# Patient Record
Sex: FEMALE | Race: WHITE | NOT HISPANIC OR LATINO | Employment: FULL TIME | URBAN - METROPOLITAN AREA
[De-identification: names, ages, dates, MRNs, and addresses within clinical notes are randomized per-mention and may not be internally consistent; named-entity substitution may affect disease eponyms.]

---

## 2020-06-28 PROBLEM — Z13.9 ENCOUNTER FOR MEDICAL SCREENING EXAMINATION: Status: ACTIVE | Noted: 2020-06-28

## 2020-07-09 ENCOUNTER — HOSPITAL ENCOUNTER (EMERGENCY)
Facility: HOSPITAL | Age: 30
Discharge: PSYCHIATRIC OR SUBSTANCE USE FACILITY | End: 2020-07-09
Attending: EMERGENCY MEDICINE
Payer: MEDICAID

## 2020-07-09 VITALS
HEIGHT: 61 IN | BODY MASS INDEX: 22.66 KG/M2 | SYSTOLIC BLOOD PRESSURE: 130 MMHG | TEMPERATURE: 98 F | HEART RATE: 91 BPM | DIASTOLIC BLOOD PRESSURE: 66 MMHG | WEIGHT: 120 LBS | OXYGEN SATURATION: 100 % | RESPIRATION RATE: 16 BRPM

## 2020-07-09 DIAGNOSIS — F31.9 BIPOLAR 1 DISORDER: ICD-10-CM

## 2020-07-09 DIAGNOSIS — F29 PSYCHOSIS, UNSPECIFIED PSYCHOSIS TYPE: Primary | ICD-10-CM

## 2020-07-09 DIAGNOSIS — F25.0 SCHIZOAFFECTIVE DISORDER, BIPOLAR TYPE: ICD-10-CM

## 2020-07-09 LAB
ALBUMIN SERPL BCP-MCNC: 4.5 G/DL (ref 3.5–5.2)
ALP SERPL-CCNC: 64 U/L (ref 55–135)
ALT SERPL W/O P-5'-P-CCNC: 9 U/L (ref 10–44)
AMPHET+METHAMPHET UR QL: NEGATIVE
ANION GAP SERPL CALC-SCNC: 8 MMOL/L (ref 8–16)
APAP SERPL-MCNC: <3 UG/ML (ref 10–20)
AST SERPL-CCNC: 20 U/L (ref 10–40)
B-HCG UR QL: NEGATIVE
BARBITURATES UR QL SCN>200 NG/ML: NEGATIVE
BASOPHILS # BLD AUTO: 0.04 K/UL (ref 0–0.2)
BASOPHILS NFR BLD: 0.6 % (ref 0–1.9)
BENZODIAZ UR QL SCN>200 NG/ML: NEGATIVE
BILIRUB SERPL-MCNC: 0.6 MG/DL (ref 0.1–1)
BILIRUB UR QL STRIP: NEGATIVE
BUN SERPL-MCNC: 17 MG/DL (ref 6–20)
BZE UR QL SCN: NEGATIVE
CALCIUM SERPL-MCNC: 9.3 MG/DL (ref 8.7–10.5)
CANNABINOIDS UR QL SCN: NEGATIVE
CHLORIDE SERPL-SCNC: 107 MMOL/L (ref 95–110)
CLARITY UR: CLEAR
CO2 SERPL-SCNC: 27 MMOL/L (ref 23–29)
COLOR UR: YELLOW
CREAT SERPL-MCNC: 0.9 MG/DL (ref 0.5–1.4)
CREAT UR-MCNC: 272.6 MG/DL (ref 15–325)
CTP QC/QA: YES
DIFFERENTIAL METHOD: ABNORMAL
EOSINOPHIL # BLD AUTO: 0 K/UL (ref 0–0.5)
EOSINOPHIL NFR BLD: 0.5 % (ref 0–8)
ERYTHROCYTE [DISTWIDTH] IN BLOOD BY AUTOMATED COUNT: 11.4 % (ref 11.5–14.5)
EST. GFR  (AFRICAN AMERICAN): >60 ML/MIN/1.73 M^2
EST. GFR  (NON AFRICAN AMERICAN): >60 ML/MIN/1.73 M^2
ETHANOL SERPL-MCNC: <10 MG/DL
GLUCOSE SERPL-MCNC: 96 MG/DL (ref 70–110)
GLUCOSE UR QL STRIP: NEGATIVE
HCT VFR BLD AUTO: 38.4 % (ref 37–48.5)
HGB BLD-MCNC: 12.8 G/DL (ref 12–16)
HGB UR QL STRIP: NEGATIVE
IMM GRANULOCYTES # BLD AUTO: 0.02 K/UL (ref 0–0.04)
IMM GRANULOCYTES NFR BLD AUTO: 0.3 % (ref 0–0.5)
KETONES UR QL STRIP: NEGATIVE
LEUKOCYTE ESTERASE UR QL STRIP: ABNORMAL
LYMPHOCYTES # BLD AUTO: 1.9 K/UL (ref 1–4.8)
LYMPHOCYTES NFR BLD: 29.4 % (ref 18–48)
MCH RBC QN AUTO: 32.2 PG (ref 27–31)
MCHC RBC AUTO-ENTMCNC: 33.3 G/DL (ref 32–36)
MCV RBC AUTO: 97 FL (ref 82–98)
METHADONE UR QL SCN>300 NG/ML: NEGATIVE
MICROSCOPIC COMMENT: NORMAL
MONOCYTES # BLD AUTO: 0.4 K/UL (ref 0.3–1)
MONOCYTES NFR BLD: 5.8 % (ref 4–15)
NEUTROPHILS # BLD AUTO: 4 K/UL (ref 1.8–7.7)
NEUTROPHILS NFR BLD: 63.4 % (ref 38–73)
NITRITE UR QL STRIP: NEGATIVE
NRBC BLD-RTO: 0 /100 WBC
OPIATES UR QL SCN: NEGATIVE
PCP UR QL SCN>25 NG/ML: NEGATIVE
PH UR STRIP: 7 [PH] (ref 5–8)
PLATELET # BLD AUTO: 206 K/UL (ref 150–350)
PMV BLD AUTO: 9.9 FL (ref 9.2–12.9)
POTASSIUM SERPL-SCNC: 3.9 MMOL/L (ref 3.5–5.1)
PROT SERPL-MCNC: 7.1 G/DL (ref 6–8.4)
PROT UR QL STRIP: NEGATIVE
RBC # BLD AUTO: 3.97 M/UL (ref 4–5.4)
RBC #/AREA URNS HPF: 2 /HPF (ref 0–4)
SARS-COV-2 RDRP RESP QL NAA+PROBE: NEGATIVE
SODIUM SERPL-SCNC: 142 MMOL/L (ref 136–145)
SP GR UR STRIP: 1.03 (ref 1–1.03)
TOXICOLOGY INFORMATION: NORMAL
TSH SERPL DL<=0.005 MIU/L-ACNC: 0.79 UIU/ML (ref 0.4–4)
URN SPEC COLLECT METH UR: ABNORMAL
UROBILINOGEN UR STRIP-ACNC: NEGATIVE EU/DL
WBC # BLD AUTO: 6.37 K/UL (ref 3.9–12.7)
WBC #/AREA URNS HPF: 3 /HPF (ref 0–5)

## 2020-07-09 PROCEDURE — 85025 COMPLETE CBC W/AUTO DIFF WBC: CPT

## 2020-07-09 PROCEDURE — 99285 EMERGENCY DEPT VISIT HI MDM: CPT | Mod: 25

## 2020-07-09 PROCEDURE — 81025 URINE PREGNANCY TEST: CPT | Performed by: EMERGENCY MEDICINE

## 2020-07-09 PROCEDURE — 80320 DRUG SCREEN QUANTALCOHOLS: CPT

## 2020-07-09 PROCEDURE — 80329 ANALGESICS NON-OPIOID 1 OR 2: CPT

## 2020-07-09 PROCEDURE — U0002 COVID-19 LAB TEST NON-CDC: HCPCS

## 2020-07-09 PROCEDURE — 25000003 PHARM REV CODE 250: Performed by: EMERGENCY MEDICINE

## 2020-07-09 PROCEDURE — 84443 ASSAY THYROID STIM HORMONE: CPT

## 2020-07-09 PROCEDURE — 80053 COMPREHEN METABOLIC PANEL: CPT

## 2020-07-09 PROCEDURE — 80307 DRUG TEST PRSMV CHEM ANLYZR: CPT

## 2020-07-09 PROCEDURE — 81000 URINALYSIS NONAUTO W/SCOPE: CPT | Mod: 59

## 2020-07-09 RX ORDER — BUSPIRONE HYDROCHLORIDE 10 MG/1
10 TABLET ORAL 3 TIMES DAILY
COMMUNITY
End: 2020-11-17

## 2020-07-09 RX ORDER — QUETIAPINE FUMARATE 100 MG/1
100 TABLET, FILM COATED ORAL NIGHTLY
Status: ON HOLD | COMMUNITY
End: 2020-07-17 | Stop reason: HOSPADM

## 2020-07-09 RX ORDER — CITALOPRAM 20 MG/1
20 TABLET, FILM COATED ORAL DAILY
COMMUNITY
End: 2020-11-17

## 2020-07-09 RX ORDER — BUSPIRONE HYDROCHLORIDE 10 MG/1
10 TABLET ORAL 2 TIMES DAILY
Status: DISCONTINUED | OUTPATIENT
Start: 2020-07-09 | End: 2020-07-09 | Stop reason: HOSPADM

## 2020-07-09 RX ORDER — LORAZEPAM 0.5 MG/1
1 TABLET ORAL
Status: DISCONTINUED | OUTPATIENT
Start: 2020-07-09 | End: 2020-07-09

## 2020-07-09 RX ORDER — TRAZODONE HYDROCHLORIDE 50 MG/1
50 TABLET ORAL NIGHTLY
Status: ON HOLD | COMMUNITY
End: 2020-07-17 | Stop reason: HOSPADM

## 2020-07-09 RX ADMIN — BUSPIRONE HYDROCHLORIDE 10 MG: 10 TABLET ORAL at 11:07

## 2020-07-09 NOTE — ED NOTES
Pt remains in paper scrubs. Sitter Tracy at bedside maintaining 1:1 direct visual contact and charting q15 minute patient check. Pt walking around room. Bed in low locked position. Will continue to monitor.

## 2020-07-09 NOTE — ED NOTES
Pt remains in paper scrubs. Bao Molina at bedside maintaining 1:1 direct visual contact and charting q15 minute patient check. Pt awake and alert. Respirations even and unlabored. Bed in low locked position. Side rails up x 2. Will continue to monitor.

## 2020-07-09 NOTE — ED NOTES
Pt remains in paper scrubs. Bao Molina at bedside maintaining 1:1 direct visual contact and charting q15 minute patient check. Pt resting quietly on bed. Respirations even and unlabored. Pt is alert. Bed in low locked position.Will continue to monitor.

## 2020-07-09 NOTE — ED NOTES
Patient has expressed that she does not want to return to the [facility] facility, Patient is aware that placement can not be changed

## 2020-07-09 NOTE — ED NOTES
Pt remains in paper scrubs. Sitter Tracy at bedside maintaining 1:1 direct visual contact and charting q15 minute patient check. Pt awake and alert. Respirations even and unlabored. Bed in low locked position. Will continue to monitor.

## 2020-07-09 NOTE — ED PROVIDER NOTES
Encounter Date: 2020    SCRIBE #1 NOTE: I, Aleyda Alvarenga, am scribing for, and in the presence of,  Kristian George MD. I have scribed the following portions of the note - Other sections scribed: HPI, ROS.       History     Chief Complaint   Patient presents with    Hallucinations     auditory hallucinations started x several days ago.   w/ suicidal thoughts.       This is a 30 y/o female who has Bipolar disorder, Marsha, Schizoaffective disorder, and Depression presents to the ED for an evaluation for auditory hallucinations for the past several days.  Patient reports she has been hearing the voices of her children despite them currently residing with their father.  She reports she is currently living with her mother and has been having some thoughts that her family is attempting to harm her, including placing something in her food.  She reports she was recently discharged from [facility] for a psychiatric evaluation 3-4 days ago.  She states she has been compliant with her medications, but is unsure of her next outpatient follow up appointment.  She denies fever, chills, nausea, emesis, diarrhea, abdominal pain, chest pain, shortness of breath, or any other associated symptoms.  No alleviating factors.    The history is provided by the patient.     Review of patient's allergies indicates:   Allergen Reactions    Hydroxyzine Swelling     Pt says her throat swells up     Past Medical History:   Diagnosis Date    Bipolar disorder     Depression     History of psychiatric hospitalization     Hx of psychiatric care     Marsha     Psychiatric exam requested by authority     Psychiatric problem     Schizoaffective disorder     Therapy      Past Surgical History:   Procedure Laterality Date     SECTION       Family History   Problem Relation Age of Onset    Bipolar disorder Mother     Schizophrenia Mother      Social History     Tobacco Use    Smoking status: Former Smoker     Packs/day: 1.00      Years: 18.00     Pack years: 18.00     Start date: 9/12/1998    Smokeless tobacco: Never Used    Tobacco comment: on the hazards of smoking   Substance Use Topics    Alcohol use: No     Comment: hx of drinking daquiris    Drug use: Not Currently     Types: Methamphetamines     Comment: Crystal Meth     Review of Systems   Constitutional: Negative for chills and fever.   HENT: Negative for congestion, ear pain, rhinorrhea and sore throat.    Eyes: Negative for pain and visual disturbance.   Respiratory: Negative for cough and shortness of breath.    Cardiovascular: Negative for chest pain.   Gastrointestinal: Negative for abdominal pain, diarrhea, nausea and vomiting.   Genitourinary: Negative for dysuria.   Musculoskeletal: Negative for back pain and neck pain.   Skin: Negative for rash.   Neurological: Negative for headaches.   Psychiatric/Behavioral: Positive for hallucinations.       Physical Exam     Initial Vitals [07/09/20 0814]   BP Pulse Resp Temp SpO2   121/85 110 20 98 °F (36.7 °C) 98 %      MAP       --         Physical Exam  The patient was examined specifically for the following:   General:No significant distress, Good color, Warm and dry. Head and neck:Scalp atraumatic, Neck supple. Neurological:Appropriate conversation, Gross motor deficits. Eyes:Conjugate gaze, Clear corneas. ENT: No epistaxis. Cardiac: Regular rate and rhythm, Grossly normal heart tones. Pulmonary: Wheezing, Rales. Gastrointestinal: Abdominal tenderness, Abdominal distention. Musculoskeletal: Extremity deformity, Apparent pain with range of motion of the joints. Skin: Rash.   The findings on examination were normal except for the following:  The patient is tearful.  She is paranoid that someone is poisoning her food.  She is hearing voices of her children.  She is crying during the physical exam.  The lungs are clear.  The heart tones are normal.  The abdomen is soft.  ED Course   Procedures  Labs Reviewed   CBC W/ AUTO  DIFFERENTIAL - Abnormal; Notable for the following components:       Result Value    RBC 3.97 (*)     Mean Corpuscular Hemoglobin 32.2 (*)     RDW 11.4 (*)     All other components within normal limits   COMPREHENSIVE METABOLIC PANEL - Abnormal; Notable for the following components:    ALT 9 (*)     All other components within normal limits   URINALYSIS, REFLEX TO URINE CULTURE - Abnormal; Notable for the following components:    Leukocytes, UA Trace (*)     All other components within normal limits    Narrative:     Specimen Source->Urine   ACETAMINOPHEN LEVEL - Abnormal; Notable for the following components:    Acetaminophen (Tylenol), Serum <3.0 (*)     All other components within normal limits   TSH   DRUG SCREEN PANEL, URINE EMERGENCY    Narrative:     Specimen Source->Urine   ALCOHOL,MEDICAL (ETHANOL)   SARS-COV-2 RNA AMPLIFICATION, QUAL   URINALYSIS MICROSCOPIC    Narrative:     Specimen Source->Urine   POCT URINE PREGNANCY          Imaging Results    None       Medical decision making:  Given the above this patient was just released from psychiatric facility.  She is hearing the voices of her children.  They are living with her father.  She is worried that people are poisoning her in her food.  She is crying and tearful and seems very depressed in unhappy.  She denies being suicidal or homicidal.  I believe the patient is gravely disabled.  I believe she has a mild psychosis.  She was just released from replace several days ago.  I will execute a pec in place her to Psychiatry.    This patient is medically clear for psychiatric admission.                Scribe Attestation:   Scribe #1: I performed the above scribed service and the documentation accurately describes the services I performed. I attest to the accuracy of the note.                          Clinical Impression:       ICD-10-CM ICD-9-CM   1. Psychosis, unspecified psychosis type  F29 298.9   2. Bipolar 1 disorder  F31.9 296.7   3. Schizoaffective  disorder, bipolar type  F25.0 295.70             ED Disposition Condition    Transfer to Psych Facility         ED Prescriptions     None        Follow-up Information    None                         I personally performed the services described in this documentation.  All medical record  entries made by the scribe are at my direction and in my presence.  Signed, Dr. Ariel George MD  07/09/20 1128

## 2020-07-09 NOTE — ED NOTES
Attempt made to call pt mother Maty, but no answer and unable to leave message. Pt does not want nurse to call mother in law Elise at this time.

## 2020-07-09 NOTE — ED NOTES
Katelynn Crabtree is in room # 12, was placed in paper scrubs and all cords and wires have been removed. The patient has signed their mental health rights and they have been placed in the chart. All the patient's belongings have been removed, labeled and locked in the belonging's closet. The PEC has been completed, signed, dated and placed in the patient's chart. There is a sitter Jamyra at the bedside with direct visual contact doing 15 minute observations and they have no belongings in the room. There are no family members present. The SADD tool was done on intake. The transfer sheet should be signed upon the actual transfer. The Psych hold orders have been signed, dated and placed in the chart. Vital signs are being done per orders.The psych resident will be notified. The next of kin to be notified at phone number (mother, Maty, [phone] or Mother in Law, Elise [phone]). The patient has not been medically cleared.

## 2020-07-09 NOTE — ED NOTES
"Pt reports that she was staying at the [facility] for 4 to 5 days and ever since leaving there "i'm just not myself." Was admitted to [facility] x 4 days for psychiatric tx and discharged. Went home to live with her mother Maty, but pt states her life felt like it was coming to an end, people don't want her here, and "felt like I  and came back to life." states the home has drug activity going on and she has been sober x 1 year from crystal meth, xanax, and marijuana. She does not have her own room at the home and sleeps on an air mattress. States she felt "disgusting" there and people were there with Hep C. Pt appears depressed and is crying. Has 3 children who live with their father. States she has not lost custody but left them with their father so she could get help and everyone is stabbing her in the back. Denies alcohol use and smoking. Reports compliance with her meds and "just want my life back." Pt states she hears voices of her children and family members.   "

## 2020-07-17 PROBLEM — Z13.9 ENCOUNTER FOR MEDICAL SCREENING EXAMINATION: Status: RESOLVED | Noted: 2020-06-28 | Resolved: 2020-07-17

## 2020-10-10 ENCOUNTER — HOSPITAL ENCOUNTER (EMERGENCY)
Facility: HOSPITAL | Age: 30
Discharge: PSYCHIATRIC OR SUBSTANCE USE FACILITY | End: 2020-10-11
Attending: EMERGENCY MEDICINE
Payer: MEDICAID

## 2020-10-10 DIAGNOSIS — F22 PARANOID: Primary | ICD-10-CM

## 2020-10-10 DIAGNOSIS — F23 ACUTE PSYCHOSIS: ICD-10-CM

## 2020-10-10 LAB
ALBUMIN SERPL BCP-MCNC: 4.4 G/DL (ref 3.5–5.2)
ALP SERPL-CCNC: 69 U/L (ref 55–135)
ALT SERPL W/O P-5'-P-CCNC: 15 U/L (ref 10–44)
AMPHET+METHAMPHET UR QL: NEGATIVE
ANION GAP SERPL CALC-SCNC: 14 MMOL/L (ref 8–16)
APAP SERPL-MCNC: <3 UG/ML (ref 10–20)
AST SERPL-CCNC: 20 U/L (ref 10–40)
B-HCG UR QL: NEGATIVE
BARBITURATES UR QL SCN>200 NG/ML: NEGATIVE
BASOPHILS # BLD AUTO: 0.04 K/UL (ref 0–0.2)
BASOPHILS NFR BLD: 0.5 % (ref 0–1.9)
BENZODIAZ UR QL SCN>200 NG/ML: NEGATIVE
BILIRUB SERPL-MCNC: 0.5 MG/DL (ref 0.1–1)
BILIRUB UR QL STRIP: NEGATIVE
BUN SERPL-MCNC: 9 MG/DL (ref 6–20)
BZE UR QL SCN: NEGATIVE
CALCIUM SERPL-MCNC: 9.4 MG/DL (ref 8.7–10.5)
CANNABINOIDS UR QL SCN: NEGATIVE
CHLORIDE SERPL-SCNC: 102 MMOL/L (ref 95–110)
CLARITY UR: CLEAR
CO2 SERPL-SCNC: 20 MMOL/L (ref 23–29)
COLOR UR: COLORLESS
CREAT SERPL-MCNC: 0.9 MG/DL (ref 0.5–1.4)
CREAT UR-MCNC: 24.7 MG/DL (ref 15–325)
CTP QC/QA: YES
CTP QC/QA: YES
DIFFERENTIAL METHOD: ABNORMAL
EOSINOPHIL # BLD AUTO: 0.1 K/UL (ref 0–0.5)
EOSINOPHIL NFR BLD: 0.6 % (ref 0–8)
ERYTHROCYTE [DISTWIDTH] IN BLOOD BY AUTOMATED COUNT: 11.2 % (ref 11.5–14.5)
EST. GFR  (AFRICAN AMERICAN): >60 ML/MIN/1.73 M^2
EST. GFR  (NON AFRICAN AMERICAN): >60 ML/MIN/1.73 M^2
ETHANOL SERPL-MCNC: <10 MG/DL
GLUCOSE SERPL-MCNC: 101 MG/DL (ref 70–110)
GLUCOSE UR QL STRIP: NEGATIVE
HCT VFR BLD AUTO: 36.1 % (ref 37–48.5)
HGB BLD-MCNC: 12.6 G/DL (ref 12–16)
HGB UR QL STRIP: NEGATIVE
IMM GRANULOCYTES # BLD AUTO: 0.02 K/UL (ref 0–0.04)
IMM GRANULOCYTES NFR BLD AUTO: 0.2 % (ref 0–0.5)
KETONES UR QL STRIP: NEGATIVE
LEUKOCYTE ESTERASE UR QL STRIP: NEGATIVE
LYMPHOCYTES # BLD AUTO: 3.1 K/UL (ref 1–4.8)
LYMPHOCYTES NFR BLD: 36.5 % (ref 18–48)
MCH RBC QN AUTO: 31.8 PG (ref 27–31)
MCHC RBC AUTO-ENTMCNC: 34.9 G/DL (ref 32–36)
MCV RBC AUTO: 91 FL (ref 82–98)
METHADONE UR QL SCN>300 NG/ML: NEGATIVE
MONOCYTES # BLD AUTO: 0.5 K/UL (ref 0.3–1)
MONOCYTES NFR BLD: 6 % (ref 4–15)
NEUTROPHILS # BLD AUTO: 4.8 K/UL (ref 1.8–7.7)
NEUTROPHILS NFR BLD: 56.2 % (ref 38–73)
NITRITE UR QL STRIP: NEGATIVE
NRBC BLD-RTO: 0 /100 WBC
OPIATES UR QL SCN: NEGATIVE
PCP UR QL SCN>25 NG/ML: NEGATIVE
PH UR STRIP: 6 [PH] (ref 5–8)
PLATELET # BLD AUTO: 279 K/UL (ref 150–350)
PMV BLD AUTO: 9.5 FL (ref 9.2–12.9)
POTASSIUM SERPL-SCNC: 3.6 MMOL/L (ref 3.5–5.1)
PROT SERPL-MCNC: 7.2 G/DL (ref 6–8.4)
PROT UR QL STRIP: NEGATIVE
RBC # BLD AUTO: 3.96 M/UL (ref 4–5.4)
SARS-COV-2 RDRP RESP QL NAA+PROBE: NEGATIVE
SODIUM SERPL-SCNC: 136 MMOL/L (ref 136–145)
SP GR UR STRIP: 1 (ref 1–1.03)
TOXICOLOGY INFORMATION: NORMAL
TSH SERPL DL<=0.005 MIU/L-ACNC: 1.19 UIU/ML (ref 0.4–4)
URN SPEC COLLECT METH UR: ABNORMAL
UROBILINOGEN UR STRIP-ACNC: NEGATIVE EU/DL
WBC # BLD AUTO: 8.52 K/UL (ref 3.9–12.7)

## 2020-10-10 PROCEDURE — 80307 DRUG TEST PRSMV CHEM ANLYZR: CPT

## 2020-10-10 PROCEDURE — 80329 ANALGESICS NON-OPIOID 1 OR 2: CPT

## 2020-10-10 PROCEDURE — 81003 URINALYSIS AUTO W/O SCOPE: CPT | Mod: 59

## 2020-10-10 PROCEDURE — 85025 COMPLETE CBC W/AUTO DIFF WBC: CPT

## 2020-10-10 PROCEDURE — 80320 DRUG SCREEN QUANTALCOHOLS: CPT

## 2020-10-10 PROCEDURE — 99213 PR OFFICE/OUTPT VISIT, EST, LEVL III, 20-29 MIN: ICD-10-PCS | Mod: 95,ICN,, | Performed by: PSYCHIATRY & NEUROLOGY

## 2020-10-10 PROCEDURE — 80053 COMPREHEN METABOLIC PANEL: CPT

## 2020-10-10 PROCEDURE — 84443 ASSAY THYROID STIM HORMONE: CPT

## 2020-10-10 PROCEDURE — 25000003 PHARM REV CODE 250: Performed by: EMERGENCY MEDICINE

## 2020-10-10 PROCEDURE — 99213 OFFICE O/P EST LOW 20 MIN: CPT | Mod: 95,ICN,, | Performed by: PSYCHIATRY & NEUROLOGY

## 2020-10-10 PROCEDURE — U0002 COVID-19 LAB TEST NON-CDC: HCPCS | Performed by: EMERGENCY MEDICINE

## 2020-10-10 PROCEDURE — 81025 URINE PREGNANCY TEST: CPT | Performed by: EMERGENCY MEDICINE

## 2020-10-10 PROCEDURE — 99285 EMERGENCY DEPT VISIT HI MDM: CPT

## 2020-10-10 RX ORDER — BUSPIRONE HYDROCHLORIDE 10 MG/1
10 TABLET ORAL 2 TIMES DAILY
Status: DISCONTINUED | OUTPATIENT
Start: 2020-10-10 | End: 2020-10-10

## 2020-10-10 RX ORDER — ACETAMINOPHEN 500 MG
1000 TABLET ORAL
Status: COMPLETED | OUTPATIENT
Start: 2020-10-10 | End: 2020-10-10

## 2020-10-10 RX ORDER — TRAZODONE HYDROCHLORIDE 100 MG/1
100 TABLET ORAL NIGHTLY
COMMUNITY
End: 2020-11-17

## 2020-10-10 RX ORDER — BUSPIRONE HYDROCHLORIDE 10 MG/1
10 TABLET ORAL ONCE
Status: COMPLETED | OUTPATIENT
Start: 2020-10-10 | End: 2020-10-10

## 2020-10-10 RX ORDER — QUETIAPINE FUMARATE 100 MG/1
100 TABLET, FILM COATED ORAL ONCE
Status: COMPLETED | OUTPATIENT
Start: 2020-10-10 | End: 2020-10-10

## 2020-10-10 RX ORDER — BUSPIRONE HYDROCHLORIDE 5 MG/1
5 TABLET ORAL ONCE
Status: DISCONTINUED | OUTPATIENT
Start: 2020-10-10 | End: 2020-10-10

## 2020-10-10 RX ADMIN — BUSPIRONE HYDROCHLORIDE 10 MG: 10 TABLET ORAL at 09:10

## 2020-10-10 RX ADMIN — ACETAMINOPHEN 1000 MG: 500 TABLET ORAL at 08:10

## 2020-10-10 RX ADMIN — QUETIAPINE FUMARATE 100 MG: 100 TABLET ORAL at 09:10

## 2020-10-11 VITALS
WEIGHT: 140 LBS | RESPIRATION RATE: 20 BRPM | BODY MASS INDEX: 26.43 KG/M2 | HEART RATE: 105 BPM | OXYGEN SATURATION: 98 % | HEIGHT: 61 IN | SYSTOLIC BLOOD PRESSURE: 117 MMHG | DIASTOLIC BLOOD PRESSURE: 72 MMHG | TEMPERATURE: 98 F

## 2020-10-11 NOTE — ED NOTES
PATIENT CURRENTLY PACING BACK AND FORTH, RUNNING FINGERS THROUGH HER HAIR AND TAKING SOCKS OFF AND ON.

## 2020-10-11 NOTE — ED TRIAGE NOTES
"Pt here stating "I fell like im not in my body. I feel like other people are putting drugs and alcohol in my body." Pt crying at this time. Pt reports hx of drug abuse, but has been clean for 14 months. Pt reports running out her medication about 5 days ago. Pt denies SI, reports she wants to hurt other people cause they are doing bad things to her.  "

## 2020-10-11 NOTE — ED PROVIDER NOTES
"Encounter Date: 10/10/2020    SCRIBE #1 NOTE: I, Pema Haroon, am scribing for, and in the presence of,  Jarod Menendez MD. I have scribed the following portions of the note - Other sections scribed: HPI, ROS.       History     Chief Complaint   Patient presents with    Psychiatric Evaluation     "Pt states she feels out of her body and feels like her family/mother is out to get her". Reports she feels like "other people are doing drugs in her body". Hx of substance abuse (crystal meth). Tearful during triage and scared people are out to get her. Pt reports she wants to hurt other people. Denies SI.    Paranoid     CC: Psychiatric Evaluation    HPI:  This is a 29 y.o. female who presents to the ED with a chief complaint of psychiatric evaluation. The patient states that she has not taken her medication in a few days. She feels that other people are out to get her and that "people are trying to do drugs in my body." The patient reports associated symptoms of paranoia, anxiety, and fretful sleep. No modifying factors were noted. She has a history of crystal meth use but has been sober for 14 months. The patient is currently staying with a friend of her mother.     The history is provided by the patient. The history is limited by the condition of the patient.     Review of patient's allergies indicates:   Allergen Reactions    Hydroxyzine Swelling     Pt says her throat swells up     Past Medical History:   Diagnosis Date    Bipolar disorder     Depression     History of psychiatric hospitalization     Hx of psychiatric care     Marsha     Psychiatric exam requested by authority     Psychiatric problem     Schizoaffective disorder     Therapy      Past Surgical History:   Procedure Laterality Date     SECTION       Family History   Problem Relation Age of Onset    Bipolar disorder Mother     Schizophrenia Mother      Social History     Tobacco Use    Smoking status: Former Smoker     " Packs/day: 1.00     Years: 18.00     Pack years: 18.00     Start date: 9/12/1998    Smokeless tobacco: Never Used    Tobacco comment: on the hazards of smoking   Substance Use Topics    Alcohol use: No     Comment: hx of drinking daquiris    Drug use: Not Currently     Types: Methamphetamines     Comment: Crystal Meth     Review of Systems   Constitutional: Negative.    HENT: Negative.    Eyes: Negative.    Respiratory: Negative.    Cardiovascular: Negative.    Gastrointestinal: Negative.    Genitourinary: Negative.    Musculoskeletal: Negative.    Skin: Negative.    Neurological: Negative.    Psychiatric/Behavioral: Positive for agitation, confusion, hallucinations and sleep disturbance. The patient is nervous/anxious.        Physical Exam     Initial Vitals [10/10/20 1948]   BP Pulse Resp Temp SpO2   128/84 92 18 99 °F (37.2 °C) 97 %      MAP       --         Physical Exam    Nursing note and vitals reviewed.  Constitutional: She appears well-developed and well-nourished. She is not diaphoretic. No distress.   HENT:   Head: Normocephalic and atraumatic.   Nose: Nose normal.   Eyes: EOM are normal. Pupils are equal, round, and reactive to light.   Neck: Normal range of motion. Neck supple. No JVD present.   Cardiovascular: Normal rate, regular rhythm and normal heart sounds.   No murmur heard.  Pulmonary/Chest: Breath sounds normal. No stridor. No respiratory distress. She has no wheezes. She has no rales.   Abdominal: Soft. Bowel sounds are normal. She exhibits no distension. There is no abdominal tenderness.   Musculoskeletal: Normal range of motion. No tenderness or edema.   Neurological: She is alert and oriented to person, place, and time. No cranial nerve deficit.   Skin: Skin is warm and dry. Capillary refill takes less than 2 seconds. No rash noted. No erythema.   Psychiatric: Her mood appears anxious. Her speech is rapid and/or pressured. Thought content is paranoid.         ED Course    Procedures  Labs Reviewed   URINALYSIS, REFLEX TO URINE CULTURE - Abnormal; Notable for the following components:       Result Value    Color, UA Colorless (*)     Specific [place], UA 1.000 (*)     All other components within normal limits    Narrative:     Specimen Source->Urine   ACETAMINOPHEN LEVEL - Abnormal; Notable for the following components:    Acetaminophen (Tylenol), Serum <3.0 (*)     All other components within normal limits   COMPREHENSIVE METABOLIC PANEL - Abnormal; Notable for the following components:    CO2 20 (*)     All other components within normal limits   CBC W/ AUTO DIFFERENTIAL - Abnormal; Notable for the following components:    RBC 3.96 (*)     Hematocrit 36.1 (*)     Mean Corpuscular Hemoglobin 31.8 (*)     RDW 11.2 (*)     All other components within normal limits   ALCOHOL,MEDICAL (ETHANOL)   TSH   DRUG SCREEN PANEL, URINE EMERGENCY    Narrative:     Specimen Source->Urine   POCT URINE PREGNANCY   SARS-COV-2 RDRP GENE          Imaging Results    None                     Scribe Attestation:   Scribe #1: I performed the above scribed service and the documentation accurately describes the services I performed. I attest to the accuracy of the note.      Patient underwent a work up in the emergency department including labs and physical exam, no acute organic cause of the patient's current psychiatric illness has been identified at this time. Patient medically cleared for psychiatric evaluation. Pt under PEC and with stable vitals pending psychiatric hospitalization acceptance.                 Medically cleared for psychiatry placement: 10/10/2020  9:32 PM                Clinical Impression:     ICD-10-CM ICD-9-CM   1. Paranoid  F22 297.8   2. Acute psychosis  F23 298.9                          ED Disposition Condition    Transfer to Psych Facility        I, Jarod Menendez M.D. , personally performed the services described in this documentation. All medical record entries made by  the scribe were at my direction and in my presence. I have reviewed the chart and agree that the record reflects my personal performance and is accurate and complete.  ED Prescriptions     None        Follow-up Information    None                                      Jarod Menendez MD  10/10/20 9027

## 2020-10-11 NOTE — ED NOTES
PATIENT WAS CALM UP ON LEAVING THE UNIT   Detail Level: Detailed Introduction Text (Please End With A Colon): The following procedure was deferred: Reason To Defer Override: referred to hand surgeon Dr. Troy for biopsy

## 2020-10-11 NOTE — CONSULTS
[facility]  Psychiatry  Telepsychiatry Consult Note    Please see previous notes:    Patient agreeable to consultation via telepsychiatry.    Tele-Consultation from Psychiatry started: 10/10/2020 at 830 pm   The chief complaint leading to psychiatric consultation is: agitation and paranoia  This consultation was requested by Dr. Menendez, the Emergency Department attending physician.  The location of the consulting psychiatrist is [facility].  The patient location is  [facility] EMERGENCY DEPARTMENT   The patient arrived at the ED at: this evening    Also present with the patient at the time of the consultation: tech    Patient Identification:   Katelynn Crabtree is a 29 y.o. female.    Patient information was obtained from patient and past medical records.    Consults  Subjective:     History of Present Illness:  This is a 29 year old woman with a history of schizoaffective disorder in the ED tonight with paranoia and intense affect.  She has been off her citalopram, trazodone and buspar for 5 days.  Per last d/c summary she should also have been taking depakote but states she stopped it earlier.  She left for [place] a couple of weeks ago to get away from family and 'all the people using crack'.  She states it didn't go right and people have been stealing her body and using drugs on her body and causing her to look like a drug addict and 'taking her face' away from her.  She doesn't feel real.  She feels she is being laughed and at and noone is helping her.  Denies SI.  States she homicidal against the people doing this to her and includes her mother in this.  'plus just all the people'.  Seems to feel I'm laughing at her too.  AH for 7 years she states.  Tearful but denies depressed mood.  Agreeable to hospitalization for stabalization.      Psychiatric History:   Previous Psychiatric Hospitalizations: Yes    Previous Medication Trials: Yes    Previous Suicide Attempts: no    History of Violence: no  History  "of Depression: yes  History of Marsha: yes  History of Auditory/Visual Hallucination yes  History of Delusions: yes  Outpatient psychiatrist (current & past): Yes    Substance Abuse History:  States that she has been clean for about 1 year but accused people of of causing her to have drug inside her in a psychotic manner    Legal History: Past charges/incarcerations: not asked     Family Psychiatric History: unk      Social History:  Lives with one of her mom's friend.  Her kids live in [place] with their dad.  She'd gone to [place] to get away from her mom and people smoking crack.    Psychiatric Mental Status Exam:  Arousal: alert  Sensorium/Orientation: oriented to grossly intact, person, place  Behavior/Cooperation: reluctant to participate, psychomotor agitation, restless and fidgety , eye contact normal   Speech: loud, pressured  Language: grossly intact  Mood: " im not depressed "   Affect: angry  Thought Process: racing, illogical  Thought Content:   Auditory hallucinations: YES:       Visual hallucinations: NO  Paranoia: YES:       Delusions:  YES:       Suicidal ideation: NO  Homicidal ideation: YES:       Attention/Concentration:  intact  Insight: poor awareness of illness  Judgment: limited      Past Medical History:   Past Medical History:   Diagnosis Date    Bipolar disorder     Depression     History of psychiatric hospitalization     Hx of psychiatric care     Marsha     Psychiatric exam requested by authority     Psychiatric problem     Schizoaffective disorder     Therapy       Laboratory Data:   Labs Reviewed   URINALYSIS, REFLEX TO URINE CULTURE - Abnormal; Notable for the following components:       Result Value    Color, UA Colorless (*)     Specific [place], UA 1.000 (*)     All other components within normal limits    Narrative:     Specimen Source->Urine   ACETAMINOPHEN LEVEL - Abnormal; Notable for the following components:    Acetaminophen (Tylenol), Serum <3.0 (*)     All other " components within normal limits   COMPREHENSIVE METABOLIC PANEL - Abnormal; Notable for the following components:    CO2 20 (*)     All other components within normal limits   CBC W/ AUTO DIFFERENTIAL - Abnormal; Notable for the following components:    RBC 3.96 (*)     Hematocrit 36.1 (*)     Mean Corpuscular Hemoglobin 31.8 (*)     RDW 11.2 (*)     All other components within normal limits   ALCOHOL,MEDICAL (ETHANOL)   DRUG SCREEN PANEL, URINE EMERGENCY    Narrative:     Specimen Source->Urine   TSH   POCT URINE PREGNANCY   SARS-COV-2 RDRP GENE     Allergies:    Review of patient's allergies indicates:   Allergen Reactions    Hydroxyzine Swelling     Pt says her throat swells up       Medications in ER:   Medications   QUEtiapine tablet 100 mg (has no administration in time range)   acetaminophen tablet 1,000 mg (1,000 mg Oral Given 10/10/20 2027)       Medications at home:   States she should have trazodone, citalopram and buspar.  Most recently d/c'd on:  busPIRone 10 MG tablet  Commonly known as: BUSPAR  Take 10 mg by mouth 3 (three) times daily.     citalopram 20 MG tablet  Commonly known as: CELEXA  Take 20 mg by mouth once daily.     * divalproex 500 MG Tbec  Commonly known as: DEPAKOTE  Take 1 tablet (500 mg total) by mouth every evening.     * divalproex 250 MG EC tablet  Commonly known as: DEPAKOTE  Take 1 tablet (250 mg total) by mouth once daily.    No new subjective & objective note has been filed under this hospital service since the last note was generated.      Assessment - Diagnosis - Goals:     Diagnosis/Impression: schizoaffective disorder, currently psychotic with mixed mood state and homicidal ideation.  Needs PEC    Rec:   PEC  Seroquel 100mg now     Time with patient: 15 min      More than 50% of the time was spent counseling/coordinating care    Consulting clinician was informed of the encounter and consult note.    Consultation ended: 10/10/2020 at 902 pm    Debra Deras MD    Psychiatry  [facility]

## 2020-10-11 NOTE — ED NOTES
PATIENT CURRENTLY RUNNING BACK AND FORTH TO RESTROOM X4. URINATED 2 OF THOSE TIMES A LITTLE BIT. SPENT MAJORITY OF TIME PULLING TOILET PAPER OFF ROLE AND WIPING PRIVATE AREA.

## 2020-11-17 ENCOUNTER — HOSPITAL ENCOUNTER (EMERGENCY)
Facility: HOSPITAL | Age: 30
Discharge: HOME | End: 2020-11-17
Attending: EMERGENCY MEDICINE
Payer: MEDICAID

## 2020-11-17 VITALS
RESPIRATION RATE: 18 BRPM | HEART RATE: 88 BPM | DIASTOLIC BLOOD PRESSURE: 77 MMHG | WEIGHT: 120 LBS | SYSTOLIC BLOOD PRESSURE: 132 MMHG | TEMPERATURE: 98 F | BODY MASS INDEX: 22.67 KG/M2 | OXYGEN SATURATION: 99 %

## 2020-11-17 DIAGNOSIS — F25.0 SCHIZOAFFECTIVE DISORDER, BIPOLAR TYPE: ICD-10-CM

## 2020-11-17 DIAGNOSIS — F41.9 ANXIETY: Primary | ICD-10-CM

## 2020-11-17 PROBLEM — F31.61 BIPOLAR 1 DISORDER, MIXED, MILD: Status: ACTIVE | Noted: 2020-11-17

## 2020-11-17 PROBLEM — F19.20 POLYSUBSTANCE DEPENDENCE: Status: ACTIVE | Noted: 2020-11-17

## 2020-11-17 PROCEDURE — 25000003 PHARM REV CODE 250: Performed by: EMERGENCY MEDICINE

## 2020-11-17 PROCEDURE — 99284 EMERGENCY DEPT VISIT MOD MDM: CPT

## 2020-11-17 PROCEDURE — 90792 PR PSYCHIATRIC DIAGNOSTIC EVALUATION W/MEDICAL SERVICES: ICD-10-PCS | Mod: ,,, | Performed by: PSYCHIATRY & NEUROLOGY

## 2020-11-17 PROCEDURE — 90792 PSYCH DIAG EVAL W/MED SRVCS: CPT | Mod: ,,, | Performed by: PSYCHIATRY & NEUROLOGY

## 2020-11-17 RX ORDER — LORAZEPAM 0.5 MG/1
1 TABLET ORAL
Status: COMPLETED | OUTPATIENT
Start: 2020-11-17 | End: 2020-11-17

## 2020-11-17 RX ORDER — QUETIAPINE FUMARATE 100 MG/1
100 TABLET, FILM COATED ORAL NIGHTLY
Qty: 30 TABLET | Refills: 0 | Status: ON HOLD | OUTPATIENT
Start: 2020-11-17 | End: 2023-03-13 | Stop reason: HOSPADM

## 2020-11-17 RX ORDER — BUSPIRONE HYDROCHLORIDE 10 MG/1
10 TABLET ORAL 3 TIMES DAILY
Qty: 90 TABLET | Refills: 11 | Status: ON HOLD | OUTPATIENT
Start: 2020-11-17 | End: 2023-03-13 | Stop reason: HOSPADM

## 2020-11-17 RX ORDER — BUSPIRONE HYDROCHLORIDE 10 MG/1
10 TABLET ORAL 2 TIMES DAILY
Status: DISCONTINUED | OUTPATIENT
Start: 2020-11-17 | End: 2020-11-17 | Stop reason: HOSPADM

## 2020-11-17 RX ORDER — BUTALBITAL, ACETAMINOPHEN AND CAFFEINE 50; 325; 40 MG/1; MG/1; MG/1
2 TABLET ORAL
Status: COMPLETED | OUTPATIENT
Start: 2020-11-17 | End: 2020-11-17

## 2020-11-17 RX ADMIN — LORAZEPAM 1 MG: 0.5 TABLET ORAL at 12:11

## 2020-11-17 RX ADMIN — BUTALBITAL, ACETAMINOPHEN AND CAFFEINE 2 TABLET: 50; 325; 40 TABLET ORAL at 12:11

## 2020-11-17 NOTE — ED PROVIDER NOTES
"Encounter Date: 2020    SCRIBE #1 NOTE: I, Connielynette Guerrero, am scribing for, and in the presence of,  Kristian George MD. I have scribed the following portions of the note - Other sections scribed: HPI, ROS.       History     Chief Complaint   Patient presents with    Panic Attack     arrived with [facility] she feels like " ppl are doing drugs in my body"No SI.     CC: Panic Attack    HPI: This 29 y.o female, with a medical history of bipolar disorder, depression, nicolás, and schizoaffective disorder, presents to the ED with [facility] c/o acute panic attacks accompanied by shakes. Pt reports that she recently started a new job and has since been feeling as though she is "losing my mind", stating, "I just feel like somebody is in my body that shouldn't be there." Pt notes that she is compliant with her psychiatric medications (Risperdal and Celexa). She states, "I don't want to hurt anybody, I just want to be back to my self." Additionally, pt reports experiencing a cough, frontal headaches and diarrhea (3x episodes in the last 24 hours). She denies suicidal or homicidal ideation. Pt also denies fever, chills, ear pain, eye pain, sore throat, chest pain or extremity issues. No other associated symptoms.    The history is provided by the patient.     Review of patient's allergies indicates:   Allergen Reactions    Hydroxyzine Swelling     Pt says her throat swells up     Past Medical History:   Diagnosis Date    Bipolar disorder     Depression     History of psychiatric hospitalization     Hx of psychiatric care     Nicolás     Psychiatric exam requested by authority     Psychiatric problem     Schizoaffective disorder     Therapy      Past Surgical History:   Procedure Laterality Date     SECTION       Family History   Problem Relation Age of Onset    Bipolar disorder Mother     Schizophrenia Mother      Social History     Tobacco Use    Smoking status: Former Smoker     Packs/day: 1.00     Years: " 18.00     Pack years: 18.00     Start date: 9/12/1998    Smokeless tobacco: Never Used    Tobacco comment: on the hazards of smoking   Substance Use Topics    Alcohol use: No     Comment: hx of drinking daquiris    Drug use: Not Currently     Types: Methamphetamines     Comment: Crystal Meth     Review of Systems   Constitutional: Negative for chills and fever.        (+) shakes   HENT: Negative for congestion, ear pain, rhinorrhea and sore throat.    Eyes: Negative for pain and visual disturbance.   Respiratory: Positive for cough. Negative for shortness of breath.    Cardiovascular: Negative for chest pain.   Gastrointestinal: Positive for diarrhea. Negative for abdominal pain, nausea and vomiting.   Genitourinary: Negative for dysuria.   Musculoskeletal: Negative for back pain.   Skin: Negative for rash.   Neurological: Positive for headaches. Negative for dizziness and weakness.   Psychiatric/Behavioral: Negative for suicidal ideas. The patient is nervous/anxious (Panic attacks).        Physical Exam     Initial Vitals [11/17/20 1049]   BP Pulse Resp Temp SpO2   (S) (!) 147/82 97 18 97.6 °F (36.4 °C) 97 %      MAP       --         Physical Exam  The patient was examined specifically for the following:   General:No significant distress, Good color, Warm and dry. Head and neck:Scalp atraumatic, Neck supple. Neurological:Appropriate conversation, Gross motor deficits. Eyes:Conjugate gaze, Clear corneas. ENT: No epistaxis. Cardiac: Regular rate and rhythm, Grossly normal heart tones. Pulmonary: Wheezing, Rales. Gastrointestinal: Abdominal tenderness, Abdominal distention. Musculoskeletal: Extremity deformity, Apparent pain with range of motion of the joints. Skin: Rash.   The findings on examination were normal except for the following:  The patient seems to be extremely anxious.  Lungs are clear.  The heart tones are normal.  The abdomen is soft.  The patient is tearful.  She is not suicidal homicidal or  psychotic.   ED Course   Procedures  Labs Reviewed - No data to display       Imaging Results    None       Medical decision making:  Given the above this patient presents to the emergency room with severe anxiety.  She recently was put on Celexa and Risperdal.  She was evaluated by Psychiatry in the emergency room, Dr. Roman, who recommends treatment with buspirone 10 mg t.i.d. and Seroquel 100 mg q.h.s. the patient has an appointment with the psychiatric clinic this week.  We will have her keep her appointment.  She is not suicidal homicidal or psychotic.  We believe we can manage her as an outpatient.                Scribe Attestation:   Scribe #1: I performed the above scribed service and the documentation accurately describes the services I performed. I attest to the accuracy of the note.                      Clinical Impression:     ICD-10-CM ICD-9-CM   1. Anxiety  F41.9 300.00   2. Schizoaffective disorder, bipolar type  F25.0 295.70                          ED Disposition Condition    Discharge Stable        ED Prescriptions     Medication Sig Dispense Start Date End Date Auth. Provider    busPIRone (BUSPAR) 10 MG tablet Take 1 tablet (10 mg total) by mouth 3 (three) times daily. 90 tablet 11/17/2020 11/17/2021 Kristian George MD    QUEtiapine (SEROQUEL) 100 MG Tab Take 1 tablet (100 mg total) by mouth every evening. 30 tablet 11/17/2020 11/17/2021 Kristian George MD        Follow-up Information     Follow up With Specialties Details Why Contact Info    [facility], Psychiatry, Psychology In 1 day Follow-up as scheduled this week [address] [address]  [phone]                          I personally performed the services described in this documentation.  All medical record  entries made by the scribe are at my direction and in my presence.  Signed, Dr. Ariel George MD  11/17/20 3753

## 2020-11-17 NOTE — ASSESSMENT & PLAN NOTE
Chronic history of crystal meth, heroin, alcohol use among other drugs.  Has been sober now for 15 months.  Continue with sobriety and narcotics anonymous.  Patient to continue with counseling and education.  When she gets established with [facility] in [place], she can do counseling.

## 2020-11-17 NOTE — DISCHARGE INSTRUCTIONS
Please change her medicines as directed.  Please return immediately if you get worse or if new problems develop.  Please follow-up with the psychiatric clinic as scheduled this week.

## 2020-11-17 NOTE — CONSULTS
"[facility]  Psychiatry  Consult Note    Patient Name: Katelynn Crabtree  MRN: [id number]   Code Status: Prior  Admission Date: 11/17/2020  Hospital Length of Stay: 0 days  Attending Physician: No att. providers found  Primary Care Provider: Primary Doctor No    Current Legal Status: Uncontested    Patient information was obtained from patient, Chart review, nursing, and ER records.   Inpatient consult to Psychiatry  Consult performed by: Panda Roman MD  Consult ordered by: Kristian George MD        Subjective:     Principal Problem:<principal problem not specified>    Chief Complaint:  Anxiety, panic     HPI: Patient Katelynn Crabtree presents to the hospital with anxiety and "not feeling right".  She has an appointment tomorrow at [facility] to establish care with a psychiatrist.  She went to an inpatient psychiatric facility at [facility] about a month ago where she stayed for 5-7 days.  She was discharged on risperidone 2 mg in the morning and 3 mg nightly, Depakote, citalopram 20 mg daily, Seroquel, buspirone.  She says that she went to her primary care doctor a couple weeks ago and the buspirone and Seroquel were stopped.  She felt that both of these medications were helping her.  The Depakote was also stopped because she felt that it was giving her tremors.  She has a history of depression which is worsened by her anxiety.  Currently more stressed than depressed.  Crying spells.  Denies any suicidal ideations past or present.  Prior overdoses were unintentional.  Admits to being an anxious person who worries about things that she should not worry about.  Also has a history of panic.  Last panic attack was years ago when she used to do drugs but had a panic attack today.  History of manic elevated state with no sleep for days at a time and impulsive behaviors.  Last manic episode was also years ago when she did drugs.  Admits to vague psychosis of hearing voices " of people that she knows telling her positive things, almost like her conscience.  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  She is proud to show off her narcotics anonymous key chains.  She currently feels that her medications are not working for her and does not know what to do.  Denies any thoughts of harm to herself or others.  No access to weapons.  Two of her children are living with their father in [place] and another 1 of her children is living with their father in [place].  She is currently living with her mother's friend in [place].  She says that mother has a bad alcohol problem.    Hospital Course: No notes on file         Patient History           Medical as of 2020     Past Medical History     Diagnosis Date Comments Source    Bipolar disorder -- -- Provider    Depression -- -- Provider    History of psychiatric hospitalization -- -- Provider    Hx of psychiatric care -- -- Provider    Marsha -- -- Provider    Psychiatric exam requested by authority -- -- Provider    Psychiatric problem -- -- Provider    Schizoaffective disorder -- -- Provider    Therapy -- -- Provider          Pertinent Negatives     Diagnosis Date Noted Comments Source    Suicide attempt 2016 -- Provider                  Surgical as of 2020     Past Surgical History     Procedure Laterality Date Comments Source     SECTION -- -- -- Provider                  Family as of 2020     Problem Relation Name Age of Onset Comments Source    Alcohol abuse Mother -- -- -- Provider    Bipolar disorder Mother -- -- -- Provider    Alcohol abuse Maternal Aunt -- -- -- Provider            Tobacco Use as of 2020     Smoking Status Smoking Start Date Smoking Quit Date Packs/Day Years Used    Former Smoker 1998 -- 1.00 18.00    Types Comments Smokeless Tobacco Status Smokeless Tobacco Quit Date Source    -- on the hazards of smoking Never Used -- Provider            Alcohol  Use as of 11/17/2020     Alcohol Use Drinks/Week Alcohol/Week Comments Source    No   -- hx of drinking daquiris; sober 15 months Provider    Frequency Typical Drinks Binge Drinking        -- -- --              Drug Use as of 11/17/2020     Drug Use Types Frequency Comments Source    Not Currently  Methamphetamines, Heroin -- Crystal Meth; sober 15 months Provider            Sexual Activity as of 11/17/2020     Sexually Active Birth Control Partners Comments Source    Yes -- Male -- Provider            Activities of Daily Living as of 11/17/2020     Activities of Daily Living Question Response Comments Source    Patient feels they ought to cut down on drinking/drug use No -- Provider    Patient annoyed by others criticizing their drinking/drug use No -- Provider    Patient has felt bad or guilty about drinking/drug use No -- Provider    Patient has had a drink/used drugs as an eye opener in the AM No -- Provider            Social Documentation as of 11/17/2020    None           Occupational as of 11/17/2020     Occupation Employer Comments Source    unemployed -- -- Provider            Socioeconomic as of 11/17/2020     Marital Status Spouse Name Number of Children Years Education Education Level Preferred Language Ethnicity Race Source    Single -- 3 -- -- English /White White Provider    Financial Resource Strain Food Insecurity: Worry Food Insecurity: Inability Transportation Needs: Medical Transportation Needs: Non-medical    -- -- -- -- --            Pertinent History     Question Response Comments    Lives with friends --    Place in Birth Order 3rd oldest    Lives in home --    Number of Siblings 3 --    Raised by biological parents grew up [place] and [place]    Legal Involvement none --    Childhood Trauma uneventful --    Criminal History of none --    Financial Status unemployed --    Highest Level of Education unfinished highschool --    Does patient have access to a firearm? No --      Service No --    Primary Leisure Activity -- --    Spirituality non-practicing --        Past Medical History:   Diagnosis Date    Bipolar disorder     Depression     History of psychiatric hospitalization     Hx of psychiatric care     Marsha     Psychiatric exam requested by authority     Psychiatric problem     Schizoaffective disorder     Therapy      Past Surgical History:   Procedure Laterality Date     SECTION       Family History     Problem Relation (Age of Onset)    Alcohol abuse Mother, Maternal Aunt    Bipolar disorder Mother        Tobacco Use    Smoking status: Former Smoker     Packs/day: 1.00     Years: 18.00     Pack years: 18.00     Start date: 1998    Smokeless tobacco: Never Used    Tobacco comment: on the hazards of smoking   Substance and Sexual Activity    Alcohol use: No     Comment: hx of drinking daquiris; sober 15 months    Drug use: Not Currently     Types: Methamphetamines, Heroin     Comment: Crystal Meth; sober 15 months    Sexual activity: Yes     Partners: Male     Review of patient's allergies indicates:   Allergen Reactions    Hydroxyzine Swelling     Pt says her throat swells up       No current facility-administered medications on file prior to encounter.      Current Outpatient Medications on File Prior to Encounter   Medication Sig    [DISCONTINUED] citalopram (CELEXA) 20 MG tablet Take 20 mg by mouth once daily.    [DISCONTINUED] risperiDONE (RISPERDAL) 1 MG tablet Take 1 tablet (1 mg total) by mouth once daily.    [DISCONTINUED] busPIRone (BUSPAR) 10 MG tablet Take 10 mg by mouth 3 (three) times daily.    [DISCONTINUED] divalproex (DEPAKOTE) 250 MG EC tablet Take 1 tablet (250 mg total) by mouth once daily.    [DISCONTINUED] traZODone (DESYREL) 100 MG tablet Take 100 mg by mouth every evening.     Psychotherapeutics (From admission, onward)    Start     Stop Route Frequency Ordered    20  busPIRone tablet 10 mg      -- Oral 2 times  daily 11/17/20 1351        Review of Systems   Constitutional: Positive for activity change and fatigue.   Respiratory: Negative for shortness of breath.    Cardiovascular: Negative for chest pain.   Gastrointestinal: Negative for nausea.   Musculoskeletal: Negative for myalgias.   Psychiatric/Behavioral: Positive for dysphoric mood and sleep disturbance. Negative for suicidal ideas.     Strengths and Liabilities: Liability: Patient has no suport network., Liability: Patient lacks coping skills.    Objective:     Vital Signs (Most Recent):  Temp: 97.6 °F (36.4 °C) (11/17/20 1049)  Pulse: 97 (11/17/20 1049)  Resp: 18 (11/17/20 1049)  BP: (S) (!) 147/82 (11/17/20 1049)  SpO2: 97 % (11/17/20 1049) Vital Signs (24h Range):  Temp:  [97.6 °F (36.4 °C)] 97.6 °F (36.4 °C)  Pulse:  [97] 97  Resp:  [18] 18  SpO2:  [97 %] 97 %  BP: (147)/(82) 147/82        Weight: 54.4 kg (120 lb)  Body mass index is 22.67 kg/m².    No intake or output data in the 24 hours ending 11/17/20 1435    Physical Exam  Psychiatric:      Comments: EXAMINATION    CONSTITUTIONAL  General Appearance:  Personal attire    MUSCULOSKELETAL  Muscle Strength and Tone:  Normal  Abnormal Involuntary Movements:  None noted  Gait and Station:  Not observed    PSYCHIATRIC MENTAL STATUS EXAM   Level of Consciousness:  Awake and alert  Orientation:  Name, place, date, situation  Grooming:  Fair  Psychomotor Behavior:  Anxious, restless, somewhat elevated  Speech:  Pressured and anxious  Language:  No abnormality  Mood:  Anxious  Affect:  Anxious  Thought Process:  Linear  Associations:  Intact  Thought Content:  Denies suicidal/homicidal/psychosis  Memory:  Intact to recent and remote  Attention:  Intact for conversation  Fund of Knowledge:  Appropriate for conversation  Insight:  Fair into mental illness  Judgment:  Fair into cooperation with care and sobriety            Significant Labs:   Last 24 Hours:   Recent Lab Results     None        All pertinent labs  within the past 24 hours have been reviewed.    Significant Imaging: I have reviewed all pertinent imaging results/findings within the past 24 hours.    Assessment/Plan:     Bipolar 1 disorder, mixed, mild  Patient appears to be in a bipolar mixed state likely from recent medication changes and stressors in life.  No need for acute inpatient psychiatric hospitalization at this time.  No need for PEC.  Continue citalopram 20 mg daily.  Start quetiapine 100 mg nightly and buspirone 10 mg t.i.d. (provide patient with 1 month prescription of each).  Told patient to taper off of risperidone over the course of the next few days.  She is to establish care with [facility] for a comprehensive outpatient psychiatric treatment plan.    Polysubstance dependence  Chronic history of crystal meth, heroin, alcohol use among other drugs.  Has been sober now for 15 months.  Continue with sobriety and narcotics anonymous.  Patient to continue with counseling and education.  When she gets established with [facility] in [place], she can do counseling.         Total Time:  60 minutes      Panda Roman MD   Psychiatry  [facility]

## 2020-11-17 NOTE — SUBJECTIVE & OBJECTIVE
Patient History           Medical as of 2020     Past Medical History     Diagnosis Date Comments Source    Bipolar disorder -- -- Provider    Depression -- -- Provider    History of psychiatric hospitalization -- -- Provider    Hx of psychiatric care -- -- Provider    Marsha -- -- Provider    Psychiatric exam requested by authority -- -- Provider    Psychiatric problem -- -- Provider    Schizoaffective disorder -- -- Provider    Therapy -- -- Provider          Pertinent Negatives     Diagnosis Date Noted Comments Source    Suicide attempt 2016 -- Provider                  Surgical as of 2020     Past Surgical History     Procedure Laterality Date Comments Source     SECTION -- -- -- Provider                  Family as of 2020     Problem Relation Name Age of Onset Comments Source    Alcohol abuse Mother -- -- -- Provider    Bipolar disorder Mother -- -- -- Provider    Alcohol abuse Maternal Aunt -- -- -- Provider            Tobacco Use as of 2020     Smoking Status Smoking Start Date Smoking Quit Date Packs/Day Years Used    Former Smoker 1998 -- 1.00 18.00    Types Comments Smokeless Tobacco Status Smokeless Tobacco Quit Date Source    -- on the hazards of smoking Never Used -- Provider            Alcohol Use as of 2020     Alcohol Use Drinks/Week Alcohol/Week Comments Source    No   -- hx of drinking daquiris; sober 15 months Provider    Frequency Typical Drinks Binge Drinking        -- -- --              Drug Use as of 2020     Drug Use Types Frequency Comments Source    Not Currently  Methamphetamines, Heroin -- Crystal Meth; sober 15 months Provider            Sexual Activity as of 2020     Sexually Active Birth Control Partners Comments Source    Yes -- Male -- Provider            Activities of Daily Living as of 2020     Activities of Daily Living Question Response Comments Source    Patient feels they ought to cut down on drinking/drug  use No -- Provider    Patient annoyed by others criticizing their drinking/drug use No -- Provider    Patient has felt bad or guilty about drinking/drug use No -- Provider    Patient has had a drink/used drugs as an eye opener in the AM No -- Provider            Social Documentation as of 2020    None           Occupational as of 2020     Occupation Employer Comments Source    unemployed -- -- Provider            Socioeconomic as of 2020     Marital Status Spouse Name Number of Children Years Education Education Level Preferred Language Ethnicity Race Source    Single -- 3 -- -- English /White White Provider    Financial Resource Strain Food Insecurity: Worry Food Insecurity: Inability Transportation Needs: Medical Transportation Needs: Non-medical    -- -- -- -- --            Pertinent History     Question Response Comments    Lives with friends --    Place in Birth Order 3rd oldest    Lives in home --    Number of Siblings 3 --    Raised by biological parents grew up [place]    Legal Involvement none --    Childhood Trauma uneventful --    Criminal History of none --    Financial Status unemployed --    Highest Level of Education unfinished highschool --    Does patient have access to a firearm? No --     Service No --    Primary Leisure Activity -- --    Spirituality non-practicing --        Past Medical History:   Diagnosis Date    Bipolar disorder     Depression     History of psychiatric hospitalization     Hx of psychiatric care     Marsha     Psychiatric exam requested by authority     Psychiatric problem     Schizoaffective disorder     Therapy      Past Surgical History:   Procedure Laterality Date     SECTION       Family History     Problem Relation (Age of Onset)    Alcohol abuse Mother, Maternal Aunt    Bipolar disorder Mother        Tobacco Use    Smoking status: Former Smoker     Packs/day: 1.00     Years: 18.00     Pack years: 18.00      Start date: 9/12/1998    Smokeless tobacco: Never Used    Tobacco comment: on the hazards of smoking   Substance and Sexual Activity    Alcohol use: No     Comment: hx of drinking daquiris; sober 15 months    Drug use: Not Currently     Types: Methamphetamines, Heroin     Comment: Crystal Meth; sober 15 months    Sexual activity: Yes     Partners: Male     Review of patient's allergies indicates:   Allergen Reactions    Hydroxyzine Swelling     Pt says her throat swells up       No current facility-administered medications on file prior to encounter.      Current Outpatient Medications on File Prior to Encounter   Medication Sig    [DISCONTINUED] citalopram (CELEXA) 20 MG tablet Take 20 mg by mouth once daily.    [DISCONTINUED] risperiDONE (RISPERDAL) 1 MG tablet Take 1 tablet (1 mg total) by mouth once daily.    [DISCONTINUED] busPIRone (BUSPAR) 10 MG tablet Take 10 mg by mouth 3 (three) times daily.    [DISCONTINUED] divalproex (DEPAKOTE) 250 MG EC tablet Take 1 tablet (250 mg total) by mouth once daily.    [DISCONTINUED] traZODone (DESYREL) 100 MG tablet Take 100 mg by mouth every evening.     Psychotherapeutics (From admission, onward)    Start     Stop Route Frequency Ordered    11/17/20 2100  busPIRone tablet 10 mg      -- Oral 2 times daily 11/17/20 1351        Review of Systems   Constitutional: Positive for activity change and fatigue.   Respiratory: Negative for shortness of breath.    Cardiovascular: Negative for chest pain.   Gastrointestinal: Negative for nausea.   Musculoskeletal: Negative for myalgias.   Psychiatric/Behavioral: Positive for dysphoric mood and sleep disturbance. Negative for suicidal ideas.     Strengths and Liabilities: Liability: Patient has no suport network., Liability: Patient lacks coping skills.    Objective:     Vital Signs (Most Recent):  Temp: 97.6 °F (36.4 °C) (11/17/20 1049)  Pulse: 97 (11/17/20 1049)  Resp: 18 (11/17/20 1049)  BP: (S) (!) 147/82 (11/17/20  1049)  SpO2: 97 % (11/17/20 1049) Vital Signs (24h Range):  Temp:  [97.6 °F (36.4 °C)] 97.6 °F (36.4 °C)  Pulse:  [97] 97  Resp:  [18] 18  SpO2:  [97 %] 97 %  BP: (147)/(82) 147/82        Weight: 54.4 kg (120 lb)  Body mass index is 22.67 kg/m².    No intake or output data in the 24 hours ending 11/17/20 1435    Physical Exam  Psychiatric:      Comments: EXAMINATION    CONSTITUTIONAL  General Appearance:  Personal attire    MUSCULOSKELETAL  Muscle Strength and Tone:  Normal  Abnormal Involuntary Movements:  None noted  Gait and Station:  Not observed    PSYCHIATRIC MENTAL STATUS EXAM   Level of Consciousness:  Awake and alert  Orientation:  Name, place, date, situation  Grooming:  Fair  Psychomotor Behavior:  Anxious, restless, somewhat elevated  Speech:  Pressured and anxious  Language:  No abnormality  Mood:  Anxious  Affect:  Anxious  Thought Process:  Linear  Associations:  Intact  Thought Content:  Denies suicidal/homicidal/psychosis  Memory:  Intact to recent and remote  Attention:  Intact for conversation  Fund of Knowledge:  Appropriate for conversation  Insight:  Fair into mental illness  Judgment:  Fair into cooperation with care and sobriety            Significant Labs:   Last 24 Hours:   Recent Lab Results     None        All pertinent labs within the past 24 hours have been reviewed.    Significant Imaging: I have reviewed all pertinent imaging results/findings within the past 24 hours.

## 2020-11-17 NOTE — ASSESSMENT & PLAN NOTE
Patient appears to be in a bipolar mixed state likely from recent medication changes and stressors in life.  No need for acute inpatient psychiatric hospitalization at this time.  No need for PEC.  Continue citalopram 20 mg daily.  Start quetiapine 100 mg nightly and buspirone 10 mg t.i.d. (provide patient with 1 month prescription of each).  Told patient to taper off of risperidone over the course of the next few days.  She is to [facility] care with [facility] for a comprehensive outpatient psychiatric treatment plan.

## 2020-11-17 NOTE — HPI
"Patient Katelynn Crabtree presents to the hospital with anxiety and "not feeling right".  She has an appointment tomorrow at [facility] ([place]) to establish care with a psychiatrist.  She went to an inpatient psychiatric facility at [facility] about a month ago where she stayed for 5-7 days.  She was discharged on risperidone 2 mg in the morning and 3 mg nightly, Depakote, citalopram 20 mg daily, Seroquel, buspirone.  She says that she went to her primary care doctor a couple weeks ago and the buspirone and Seroquel were stopped.  She felt that both of these medications were helping her.  The Depakote was also stopped because she felt that it was giving her tremors.  She has a history of depression which is worsened by her anxiety.  Currently more stressed than depressed.  Crying spells.  Denies any suicidal ideations past or present.  Prior overdoses were unintentional.  Admits to being an anxious person who worries about things that she should not worry about.  Also has a history of panic.  Last panic attack was years ago when she used to do drugs but had a panic attack today.  History of manic elevated state with no sleep for days at a time and impulsive behaviors.  Last manic episode was also years ago when she did drugs.  Admits to vague psychosis of hearing voices of people that she knows telling her positive things, almost like her conscience.  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  She is proud to show off her narcotics anonymous key chains.  She currently feels that her medications are not working for her and does not know what to do.  Denies any thoughts of harm to herself or others.  No access to weapons.  Two of her children are living with their father in [place] and another 1 of her children is living with their father in [place].  She is currently living with her mother's friend in [place].  She says that mother has a bad alcohol problem.  "

## 2020-11-21 ENCOUNTER — HOSPITAL ENCOUNTER (EMERGENCY)
Facility: HOSPITAL | Age: 30
Discharge: PSYCHIATRIC OR SUBSTANCE USE FACILITY | End: 2020-11-21
Attending: EMERGENCY MEDICINE
Payer: MEDICAID

## 2020-11-21 VITALS
OXYGEN SATURATION: 100 % | WEIGHT: 164 LBS | SYSTOLIC BLOOD PRESSURE: 128 MMHG | BODY MASS INDEX: 25.74 KG/M2 | HEART RATE: 85 BPM | RESPIRATION RATE: 18 BRPM | TEMPERATURE: 98 F | HEIGHT: 67 IN | DIASTOLIC BLOOD PRESSURE: 71 MMHG

## 2020-11-21 DIAGNOSIS — Z00.8 MEDICAL CLEARANCE FOR PSYCHIATRIC ADMISSION: ICD-10-CM

## 2020-11-21 DIAGNOSIS — F41.0 PANIC ATTACKS: ICD-10-CM

## 2020-11-21 DIAGNOSIS — F32.2 CURRENT SEVERE EPISODE OF MAJOR DEPRESSIVE DISORDER WITHOUT PSYCHOTIC FEATURES, UNSPECIFIED WHETHER RECURRENT: Primary | ICD-10-CM

## 2020-11-21 LAB
ALBUMIN SERPL BCP-MCNC: 4.4 G/DL (ref 3.5–5.2)
ALP SERPL-CCNC: 63 U/L (ref 55–135)
ALT SERPL W/O P-5'-P-CCNC: 10 U/L (ref 10–44)
AMORPH CRY URNS QL MICRO: ABNORMAL
AMPHET+METHAMPHET UR QL: NEGATIVE
ANION GAP SERPL CALC-SCNC: 10 MMOL/L (ref 8–16)
APAP SERPL-MCNC: <3 UG/ML (ref 10–20)
AST SERPL-CCNC: 17 U/L (ref 10–40)
B-HCG UR QL: NEGATIVE
BACTERIA #/AREA URNS HPF: ABNORMAL /HPF
BARBITURATES UR QL SCN>200 NG/ML: NORMAL
BASOPHILS # BLD AUTO: 0.05 K/UL (ref 0–0.2)
BASOPHILS NFR BLD: 0.7 % (ref 0–1.9)
BENZODIAZ UR QL SCN>200 NG/ML: NEGATIVE
BILIRUB SERPL-MCNC: 0.4 MG/DL (ref 0.1–1)
BILIRUB UR QL STRIP: NEGATIVE
BUN SERPL-MCNC: 8 MG/DL (ref 6–20)
BZE UR QL SCN: NEGATIVE
CALCIUM SERPL-MCNC: 9.8 MG/DL (ref 8.7–10.5)
CANNABINOIDS UR QL SCN: NEGATIVE
CHLORIDE SERPL-SCNC: 105 MMOL/L (ref 95–110)
CLARITY UR: ABNORMAL
CO2 SERPL-SCNC: 26 MMOL/L (ref 23–29)
COLOR UR: YELLOW
CREAT SERPL-MCNC: 0.9 MG/DL (ref 0.5–1.4)
CREAT UR-MCNC: 214.8 MG/DL (ref 15–325)
CTP QC/QA: YES
CTP QC/QA: YES
DIFFERENTIAL METHOD: ABNORMAL
EOSINOPHIL # BLD AUTO: 0.1 K/UL (ref 0–0.5)
EOSINOPHIL NFR BLD: 0.7 % (ref 0–8)
ERYTHROCYTE [DISTWIDTH] IN BLOOD BY AUTOMATED COUNT: 12 % (ref 11.5–14.5)
EST. GFR  (AFRICAN AMERICAN): >60 ML/MIN/1.73 M^2
EST. GFR  (NON AFRICAN AMERICAN): >60 ML/MIN/1.73 M^2
ETHANOL SERPL-MCNC: <10 MG/DL
GLUCOSE SERPL-MCNC: 103 MG/DL (ref 70–110)
GLUCOSE UR QL STRIP: NEGATIVE
HCT VFR BLD AUTO: 40.6 % (ref 37–48.5)
HGB BLD-MCNC: 13.9 G/DL (ref 12–16)
HGB UR QL STRIP: NEGATIVE
IMM GRANULOCYTES # BLD AUTO: 0.01 K/UL (ref 0–0.04)
IMM GRANULOCYTES NFR BLD AUTO: 0.1 % (ref 0–0.5)
KETONES UR QL STRIP: ABNORMAL
LEUKOCYTE ESTERASE UR QL STRIP: NEGATIVE
LYMPHOCYTES # BLD AUTO: 2 K/UL (ref 1–4.8)
LYMPHOCYTES NFR BLD: 26.3 % (ref 18–48)
MCH RBC QN AUTO: 31.2 PG (ref 27–31)
MCHC RBC AUTO-ENTMCNC: 34.2 G/DL (ref 32–36)
MCV RBC AUTO: 91 FL (ref 82–98)
METHADONE UR QL SCN>300 NG/ML: NEGATIVE
MICROSCOPIC COMMENT: ABNORMAL
MONOCYTES # BLD AUTO: 0.3 K/UL (ref 0.3–1)
MONOCYTES NFR BLD: 4.3 % (ref 4–15)
NEUTROPHILS # BLD AUTO: 5.1 K/UL (ref 1.8–7.7)
NEUTROPHILS NFR BLD: 67.9 % (ref 38–73)
NITRITE UR QL STRIP: NEGATIVE
NRBC BLD-RTO: 0 /100 WBC
OPIATES UR QL SCN: NEGATIVE
PCP UR QL SCN>25 NG/ML: NEGATIVE
PH UR STRIP: 7 [PH] (ref 5–8)
PLATELET # BLD AUTO: 283 K/UL (ref 150–350)
PMV BLD AUTO: 9.9 FL (ref 9.2–12.9)
POTASSIUM SERPL-SCNC: 4.5 MMOL/L (ref 3.5–5.1)
PROT SERPL-MCNC: 7.3 G/DL (ref 6–8.4)
PROT UR QL STRIP: NEGATIVE
RBC # BLD AUTO: 4.46 M/UL (ref 4–5.4)
SARS-COV-2 RDRP RESP QL NAA+PROBE: NEGATIVE
SODIUM SERPL-SCNC: 141 MMOL/L (ref 136–145)
SP GR UR STRIP: 1.02 (ref 1–1.03)
SQUAMOUS #/AREA URNS HPF: 1 /HPF
TOXICOLOGY INFORMATION: NORMAL
TSH SERPL DL<=0.005 MIU/L-ACNC: 0.66 UIU/ML (ref 0.4–4)
URN SPEC COLLECT METH UR: ABNORMAL
UROBILINOGEN UR STRIP-ACNC: NEGATIVE EU/DL
VALPROATE SERPL-MCNC: <12.5 UG/ML (ref 50–100)
WBC # BLD AUTO: 7.46 K/UL (ref 3.9–12.7)
WBC #/AREA URNS HPF: 1 /HPF (ref 0–5)

## 2020-11-21 PROCEDURE — 80307 DRUG TEST PRSMV CHEM ANLYZR: CPT

## 2020-11-21 PROCEDURE — 81000 URINALYSIS NONAUTO W/SCOPE: CPT | Mod: 59

## 2020-11-21 PROCEDURE — 80320 DRUG SCREEN QUANTALCOHOLS: CPT

## 2020-11-21 PROCEDURE — 81025 URINE PREGNANCY TEST: CPT | Performed by: EMERGENCY MEDICINE

## 2020-11-21 PROCEDURE — 99214 OFFICE O/P EST MOD 30 MIN: CPT | Mod: 95,,, | Performed by: NURSE PRACTITIONER

## 2020-11-21 PROCEDURE — 84443 ASSAY THYROID STIM HORMONE: CPT

## 2020-11-21 PROCEDURE — 25000003 PHARM REV CODE 250: Performed by: EMERGENCY MEDICINE

## 2020-11-21 PROCEDURE — 80053 COMPREHEN METABOLIC PANEL: CPT

## 2020-11-21 PROCEDURE — 80164 ASSAY DIPROPYLACETIC ACD TOT: CPT

## 2020-11-21 PROCEDURE — 85025 COMPLETE CBC W/AUTO DIFF WBC: CPT

## 2020-11-21 PROCEDURE — 80329 ANALGESICS NON-OPIOID 1 OR 2: CPT

## 2020-11-21 PROCEDURE — 99214 PR OFFICE/OUTPT VISIT, EST, LEVL IV, 30-39 MIN: ICD-10-PCS | Mod: 95,,, | Performed by: NURSE PRACTITIONER

## 2020-11-21 PROCEDURE — 99285 EMERGENCY DEPT VISIT HI MDM: CPT

## 2020-11-21 PROCEDURE — U0002 COVID-19 LAB TEST NON-CDC: HCPCS | Performed by: EMERGENCY MEDICINE

## 2020-11-21 RX ORDER — QUETIAPINE FUMARATE 100 MG/1
100 TABLET, FILM COATED ORAL ONCE
Status: COMPLETED | OUTPATIENT
Start: 2020-11-21 | End: 2020-11-21

## 2020-11-21 RX ORDER — ACETAMINOPHEN 500 MG
1000 TABLET ORAL
Status: COMPLETED | OUTPATIENT
Start: 2020-11-21 | End: 2020-11-21

## 2020-11-21 RX ADMIN — QUETIAPINE FUMARATE 100 MG: 100 TABLET ORAL at 06:11

## 2020-11-21 RX ADMIN — ACETAMINOPHEN 1000 MG: 500 TABLET ORAL at 08:11

## 2020-11-21 NOTE — ED PROVIDER NOTES
"EM PHYSICIAN NOTE    HPI  This patient presents with a complaint of   Chief Complaint   Patient presents with    Medication Reaction     pt with medication change x 3 days ago (Buspar and Seroquel) since then has been feeling "disconnected", "in a daze", with shakes.  denies pain throughout.         HPI: Katelynn Crabtree is a 29 year old female who presents to the ED with an onset of anxiousness, and overwhelming hopelessness lasting three to four days. Patient states that she does not feel like herself and wants to "feel normal" again. She denies any thoughts of harming herself, but is concerned about her ability to care for herself.  She reports she has been crying a lot and has a headache.  She has not been eating well.  She has had recent changes to her psychiatric medications by her psychiatrist due to the symptoms.  Past medical history of bipolar disorder and prior psychiatric hospitalization. She is taking Celexa, Buspar, and Seroquel. Last menstrual period one month ago.    REVIEW of PMH, SOC History and Family History:  Past Medical History:   Diagnosis Date    Bipolar disorder     Depression     History of psychiatric hospitalization     Hx of psychiatric care     Marsha     Psychiatric exam requested by authority     Psychiatric problem     Schizoaffective disorder     Therapy      Past Surgical History:   Procedure Laterality Date     SECTION         Review of patient's allergies indicates:   Allergen Reactions    Hydroxyzine Swelling     Pt says her throat swells up       Family history and social history reviewed.      REVIEW of SYSTEMS  Source: Patient  The nurse's notes and triage vital signs were reviewed.  GENERAL/CONSTITUTIONAL: There is no report of fever, fatigue, weakness, or unexplained weight loss.  CARDIOVASCULAR: There is no report of chest pain   RESPIRATORY: There is no report of cough or SOB  GASTROINTESTINAL: There is no report of nausea, vomiting, " "diarrhea  MUSCULOSKELETAL: There is no report of joint or muscle pain. No muscle weakness or tenderness.  SKIN AND BREASTS: There is no report of easy bruising, skin redness, skin rash.  HEMATOLOGIC/LYMPHATIC: There is no report of anemia, bleeding or clotting defects. There is no report of anticoagulant use.   NEUROLOGICAL: Positive for headaches, not atypical or sudden onset  PSYCHOLOGICAL: Positive for nervous/anxious  The remainder of the ROS is negative.        PHYSICAL EXAMINATION    ED Triage Vitals [11/21/20 1206]   Enc Vitals Group      /84      Pulse 89      Resp 18      Temp 98.1 °F (36.7 °C)      Temp src Oral      SpO2 99 %      Weight 164 lb      Height 5' 7"      Head Circumference       Peak Flow       Pain Score       Pain Loc       Pain Edu?       Excl. in GC?      Vital signs and Pulse Ox reviewed in clinical context. Abnormalities noted: none:  Heart rate, blood pressure, temperature, respiratory rate and pulse ox are all within normal limits for age  Pt's level of consciousness is alert, and the patient is in moderate distress.  GCS: 4 - Opens eyes on own, 6 - Follows simple motor commands, 5 - Alert and oriented = total 15  Skin: warm, pink and dry  Mucosa:moist  Head and Neck: WNL  Cardiac exam: RRR  Pulmonary exam: unlabored and clear  Abd Exam: soft nontender   Musculoskeletal: no joint tenderness, deformity or swelling   Neurologic: GCS 15. 5 over 5 strength, cranial nerves intact, neck supple  Psychological: Tearful and anxious appearing, no SI or HI       Initial Impression: This is a 29 year old female who presents with nervousness and anxiousness. Physical exam as described.  Plan: I will order a tele-psych consultation for further evaluation   Micelle DARCI Contreras MD, 2:20 PM 11/21/2020      Medical decision making:   Nurses notes and Vital Signs reviewed.  Orders Placed This Encounter   Procedures    CBC auto differential    Comprehensive metabolic panel    TSH    Drug screen " panel, emergency    Ethanol    Acetaminophen level    Urinalysis - clean catch    Urinalysis Microscopic    Valproic Acid    Valproic Acid    Diet Adult Regular (IDDSI Level 7)    Direct Psych Observation    Patient to be undressed and in gown with patient belongings stored securely    Routine Nurse Assessment    Vital signs    Notify physician (specify)    Vital signs    Undress patient and allow them to wear facility provided apparel.    Direct Psych Observation    Nursing communication    Inpatient consult to Psychiatry    POCT urine pregnancy    POCT COVID-19 Rapid Screening    PEC/Psych Hold - Physicians Emergency Certificate / 72 Hour Psych Hold       ED Course as of Nov 21 1805   Sat Nov 21, 2020   1619 COVID negative    [MH]   1619 UA is within normal limits    [MH]   1620 CBC is normal    [MH]   1620 CMP is normal    [MH]   1620 Medically clear for psych consult    []   1803 Seen by Telepsych: rec inpatient     []      ED Course User Index  [MH] Rell Contreras MD       Diagnoses that have been ruled out:   None   Diagnoses that are still under consideration:   None   Final diagnoses:   Current severe episode of major depressive disorder without psychotic features, unspecified whether recurrent   Panic attacks   Medical clearance for psychiatric admission            Follow-up Information    None       ED Prescriptions     None          This note was created using Dictation Software.  This program may occasionally misinterpret certain words and phrases.      SCRIBE ATTESTATION NOTE:  I attest that I personally performed the services documented by the scribe and acknowledged and confirm the content of the note.   Nurses notes were reviewed.  Rell Contreras        Nurses notes were reviewed.      CRITICAL CARE TIME:  Based on this patient's presenting history and physical exam, this patient had high probability of sudden, clinically significant deterioration and the services I  provided to this patient were to treat and/or prevent clinically significant deterioration. Coordination of care with other physicians  Review and interpretation of laboratory studies with re-assessment of plan of care  Review of diagnosis, treatment options and prognosis with patient. Aggregate critical care time was approximately  35 minutes, which includes only time during which I was engaged in work directly related to the patient's care, as described above, whether at the bedside or elsewhere in the Emergency Department.  It did not include time spent performing other reported procedures or the services of residents, students, nurses or physician assistants.                                 Rell Contreras MD  11/21/20 1622       Rell Contreras MD  11/21/20 180

## 2020-11-21 NOTE — ED NOTES
CONTACTED [facility] FOR TELE PSYCH CONSULT,NURSE PRACTITIONER WILLIAM MONROE WILL CALL BACK VIA TELE MONITOR.

## 2020-11-21 NOTE — CONSULTS
"[facility]  Psychiatry  Telepsychiatry Consult Note    Please see previous notes:    Patient agreeable to consultation via telepsychiatry.    Tele-Consultation from Psychiatry started: 11/21/2020 at 5:00 pm  The chief complaint leading to psychiatric consultation is: Panic attacks  This consultation was requested by DR. Contreras, the Emergency Department attending physician.  The location of the consulting psychiatrist is [address] [place] [address].  The patient location is  [facility] EMERGENCY DEPARTMENT   The patient arrived at the ED at: 1:01 pm  Also present with the patient at the time of the consultation: Patient    Patient Identification:   Katelynn Crabtree is a 29 y.o. female.    Patient information was obtained from patient.  Patient presented voluntarily to the Emergency Department by private vehicle.    Consults  Subjective:     History of Present Illness:  Per ED Note:   Medication Reaction       pt with medication change x 3 days ago (Buspar and Seroquel) since then has been feeling "disconnected", "in a daze", with shakes.  denies pain throughout.           HPI: Katelynn Crabtree is a 29 year old female who presents to the ED with an onset of anxiousness, and overwhelming hopelessness lasting three to four days. Patient states that she does not feel like herself and wants to "feel normal" again. She denies any thoughts of harming herself, but is concerned about her ability to care for herself.  She reports she has been crying a lot and has a headache.  She has not been eating well.  She has had recent changes to her psychiatric medications by her psychiatrist due to the symptoms.  Past medical history of bipolar disorder and prior psychiatric hospitalization. She is taking Celexa, Buspar, and Seroquel. Last menstrual period one month ago.     REVIEW of PMH, SOC History and Family History    On My Interview: 11/21/2020    Psychiatry consult was conducted via telemedicine while patient was " "seated at her ED bed. She presented as vas very anxious, sad, tearful, overwhelmed. When she was asked about the reason for ED visit she replied, "I have been having panic attacks. I don't feel like myself. I feel like I have someone inside my body who is hurting me. I feel like I am not me. Everything feels like fallon ending but I know I am here. I don't want to feel like this anymore. I am shaking on my right side. I have been crying all the time. I cannot do anything and I feel like I am walking in circles. have been having bad headaches."  She reported increased panic attacks and stated she had a panic attack today. She stated her panic attacks last a long time at least few hours. She reported panic attack symptoms of restlessness, heart racing, crying spells, chest pain, tremors, numbness in her arms and legs, bad headaches and feelings out of control as if she is losing herself.  She denied having suicidal thoughts currently but had SI in the past. No previous suicide attempts. She denied auditory hallucinations or visual hallucinations. She stated, "I feel like people are about to get me." She reported difficulties maintaining sleep. She denied alcohol or drug use currently. She stated she has been sober from all drugs over the past 15 months. She stated she tried different drugs in the past but her DOC is meth. She denied  nicolás or hypomania symptom at this time. However, she reported previous diagnosis of bipolar disorder. When she was asked if she was feeling depressed she denied initially but later she stated," I guess I am kinds depressed". She reported taking Buspar 10 mg po TID, and Seroquel 40 mg po qhs.      Psychiatric History:   Previous Psychiatric Hospitalizations: Yes "the last time was a month ago for medications," She had 8-9 previous hospitalizations  Previous Medication Trials: Yes Buspar and Seroquel  Previous Suicide Attempts: no   History of Violence: yes  History of Depression: " "yes  History of Marsha: No  History of Auditory/Visual Hallucination No  History of Delusions: No  Outpatient psychiatrist (current & past): Yes. She goes to [facility], operated by [facility] in [place]    Substance Abuse History:  Tobacco:No  Alcohol: No  Illicit Substances:Not current  Detox/Rehab: Yes. 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 She stated she experimented with different drugs cannabis, meth, heroin, cocaine, etc    Legal History: Past charges/incarcerations: Yes "juvenile [facility]"    Family Psychiatric History: Mother has bipolar and multiple personality disorder      Social History:  Developmental/Childhood:Achieved all developmental milestones timely  *Education:11 grade  Employment Status/Finances:Employed   Relationship Status/Sexual Orientation: Single:    Children: 3  Housing Status: Home with her mother's friend   history:  NO  Access to gun: NO  Alevism:Non-practicing  Recreational activities:Music/CT    Psychiatric Mental Status Exam:  Arousal: alert  Sensorium/Orientation: oriented to grossly intact  Behavior/Cooperation: cooperative   Speech: normal tone, normal rate, normal pitch, normal volume  Language: grossly intact, able to name, able to repeat  Mood: " anxious and depressed"   Affect: anxious and tearful  Thought Process: goal-directed  Thought Content:   Auditory hallucinations: NO  Visual hallucinations: NO  Paranoia: yes. " I think my life is taken away by other people. I feel like someone is inside myself and is taking away my life."  Delusions:  NO  Suicidal ideation: NO  Homicidal ideation: NO  Attention/Concentration:  spelled "WORLD" forwards and backwards  Memory:    Recent:  Intact   Remote: Intact   3/3 immediate, 3/3 at 5 min  Fund of Knowledge: Aware of current events as she was able to stated the names of the last 3 presidents  Abstract reasoning: proverbs were abstract, similarities were abstract  Insight: has awareness of illness  Judgment: impaired due to illness      Past " Medical History:   Past Medical History:   Diagnosis Date    Bipolar disorder     Depression     History of psychiatric hospitalization     Hx of psychiatric care     Marsha     Psychiatric exam requested by authority     Psychiatric problem     Schizoaffective disorder     Therapy       Laboratory Data:   Labs Reviewed   CBC W/ AUTO DIFFERENTIAL - Abnormal; Notable for the following components:       Result Value    MCH 31.2 (*)     All other components within normal limits   ACETAMINOPHEN LEVEL - Abnormal; Notable for the following components:    Acetaminophen (Tylenol), Serum <3.0 (*)     All other components within normal limits   URINALYSIS - Abnormal; Notable for the following components:    Appearance, UA Cloudy (*)     Ketones, UA Trace (*)     All other components within normal limits   URINALYSIS MICROSCOPIC - Abnormal; Notable for the following components:    Bacteria Few (*)     All other components within normal limits   COMPREHENSIVE METABOLIC PANEL   TSH   DRUG SCREEN PANEL, URINE EMERGENCY    Narrative:     Specimen Source->Urine   ALCOHOL,MEDICAL (ETHANOL)   POCT URINE PREGNANCY   SARS-COV-2 RDRP GENE       Neurological History:  Seizures: No  Head trauma: No    Allergies:   Review of patient's allergies indicates:   Allergen Reactions    Hydroxyzine Swelling     Pt says her throat swells up       Medications in ER: Medications - No data to display    Medications at home: Buspar and Seroquel    No new subjective & objective note has been filed under this hospital service since the last note was generated.      Assessment - Diagnosis - Goals:     Diagnosis/Impression:   Bipolar disorder per patient by history but not currently  DEMARCUS   Panic attacks    Rec:     Rec: Once medically cleared seek involuntary inpatient psychiatric admission for stabilization of acute psychiatric symptoms.  PEC because pt s gravely disabled.  She is extremely anxious, overwhelmed, panicky and unable to function.    Medications: Defer to ED for non psychiatric medications.  Continue home medications:  Buspar 10 mg po BID for anxiety  Seroquel 50 mg po qhs prn  Start Vistaril 10 mg po prn q6 hours for anxiety      Time with patient: 30 minutes      More than 50% of the time was spent counseling/coordinating care    Consulting clinician was informed of the encounter and consult note.    Consultation ended: 11/21/2020 at 5:40 pm    Latoya Valles NP   Psychiatry  [facility]

## 2020-11-21 NOTE — ED TRIAGE NOTES
"Pt reports to ED c/o a possible medication reaction to a change in psych meds. Pt changed to buspar and seroquel, pt d/c risperidol and depakote. Pt feels like "her head is off her body, and just not right, like her head is way over there". Pt reports having a panic attack before coming in and anxiety. Pt reports a HA starting earlier today.   "

## 2020-11-21 NOTE — ED NOTES
JUSTO faxed over to [facility] 's Office at 4:25 and received by . JUSTO scanned into chart by registration.

## 2021-06-05 ENCOUNTER — HOSPITAL ENCOUNTER (EMERGENCY)
Facility: HOSPITAL | Age: 31
Discharge: HOME | End: 2021-06-05
Attending: EMERGENCY MEDICINE
Payer: MEDICAID

## 2021-06-05 VITALS
HEART RATE: 92 BPM | HEIGHT: 62 IN | RESPIRATION RATE: 18 BRPM | TEMPERATURE: 98 F | SYSTOLIC BLOOD PRESSURE: 120 MMHG | OXYGEN SATURATION: 98 % | WEIGHT: 145 LBS | DIASTOLIC BLOOD PRESSURE: 71 MMHG | BODY MASS INDEX: 26.68 KG/M2

## 2021-06-05 DIAGNOSIS — R21 RASH: ICD-10-CM

## 2021-06-05 DIAGNOSIS — L30.9 DERMATITIS: Primary | ICD-10-CM

## 2021-06-05 PROCEDURE — 99284 EMERGENCY DEPT VISIT MOD MDM: CPT | Mod: 25

## 2021-06-05 PROCEDURE — 63600175 PHARM REV CODE 636 W HCPCS: Performed by: PHYSICIAN ASSISTANT

## 2021-06-05 PROCEDURE — 25000003 PHARM REV CODE 250: Performed by: PHYSICIAN ASSISTANT

## 2021-06-05 PROCEDURE — 96372 THER/PROPH/DIAG INJ SC/IM: CPT

## 2021-06-05 RX ORDER — DIPHENHYDRAMINE HYDROCHLORIDE 50 MG/ML
25 INJECTION INTRAMUSCULAR; INTRAVENOUS
Status: DISCONTINUED | OUTPATIENT
Start: 2021-06-05 | End: 2021-06-05

## 2021-06-05 RX ORDER — PERMETHRIN 50 MG/G
CREAM TOPICAL
Qty: 60 G | Refills: 0 | Status: ON HOLD | OUTPATIENT
Start: 2021-06-05 | End: 2023-03-13 | Stop reason: HOSPADM

## 2021-06-05 RX ORDER — IBUPROFEN 600 MG/1
600 TABLET ORAL EVERY 6 HOURS PRN
Qty: 20 TABLET | Refills: 0 | Status: SHIPPED | OUTPATIENT
Start: 2021-06-05 | End: 2021-06-10

## 2021-06-05 RX ORDER — KETOROLAC TROMETHAMINE 30 MG/ML
15 INJECTION, SOLUTION INTRAMUSCULAR; INTRAVENOUS
Status: DISCONTINUED | OUTPATIENT
Start: 2021-06-05 | End: 2021-06-05

## 2021-06-05 RX ORDER — DEXAMETHASONE SODIUM PHOSPHATE 4 MG/ML
6 INJECTION, SOLUTION INTRA-ARTICULAR; INTRALESIONAL; INTRAMUSCULAR; INTRAVENOUS; SOFT TISSUE
Status: DISCONTINUED | OUTPATIENT
Start: 2021-06-05 | End: 2021-06-05

## 2021-06-05 RX ORDER — CEPHALEXIN 500 MG/1
500 CAPSULE ORAL
Status: COMPLETED | OUTPATIENT
Start: 2021-06-05 | End: 2021-06-05

## 2021-06-05 RX ORDER — KETOROLAC TROMETHAMINE 30 MG/ML
30 INJECTION, SOLUTION INTRAMUSCULAR; INTRAVENOUS
Status: COMPLETED | OUTPATIENT
Start: 2021-06-05 | End: 2021-06-05

## 2021-06-05 RX ORDER — DIPHENHYDRAMINE HCL 25 MG
50 CAPSULE ORAL
Status: COMPLETED | OUTPATIENT
Start: 2021-06-05 | End: 2021-06-05

## 2021-06-05 RX ORDER — DEXAMETHASONE SODIUM PHOSPHATE 4 MG/ML
6 INJECTION, SOLUTION INTRA-ARTICULAR; INTRALESIONAL; INTRAMUSCULAR; INTRAVENOUS; SOFT TISSUE
Status: COMPLETED | OUTPATIENT
Start: 2021-06-05 | End: 2021-06-05

## 2021-06-05 RX ORDER — ACETAMINOPHEN 500 MG
500 TABLET ORAL EVERY 4 HOURS PRN
Qty: 20 TABLET | Refills: 0 | Status: SHIPPED | OUTPATIENT
Start: 2021-06-05 | End: 2021-06-10

## 2021-06-05 RX ADMIN — DEXAMETHASONE SODIUM PHOSPHATE 6 MG: 4 INJECTION INTRA-ARTICULAR; INTRALESIONAL; INTRAMUSCULAR; INTRAVENOUS; SOFT TISSUE at 02:06

## 2021-06-05 RX ADMIN — KETOROLAC TROMETHAMINE 30 MG: 30 INJECTION, SOLUTION INTRAMUSCULAR; INTRAVENOUS at 02:06

## 2021-06-05 RX ADMIN — DIPHENHYDRAMINE HYDROCHLORIDE 50 MG: 25 CAPSULE ORAL at 02:06

## 2021-06-05 RX ADMIN — CEPHALEXIN 500 MG: 500 CAPSULE ORAL at 02:06

## 2023-03-07 ENCOUNTER — HOSPITAL ENCOUNTER (EMERGENCY)
Facility: OTHER | Age: 33
Discharge: PSYCHIATRIC OR SUBSTANCE USE FACILITY | End: 2023-03-07
Attending: EMERGENCY MEDICINE
Payer: MEDICAID

## 2023-03-07 VITALS
BODY MASS INDEX: 23.6 KG/M2 | DIASTOLIC BLOOD PRESSURE: 63 MMHG | TEMPERATURE: 98 F | HEIGHT: 61 IN | SYSTOLIC BLOOD PRESSURE: 114 MMHG | RESPIRATION RATE: 16 BRPM | OXYGEN SATURATION: 98 % | WEIGHT: 125 LBS | HEART RATE: 82 BPM

## 2023-03-07 DIAGNOSIS — Z00.8 MEDICAL CLEARANCE FOR PSYCHIATRIC ADMISSION: ICD-10-CM

## 2023-03-07 DIAGNOSIS — F22 PARANOID DELUSION: Primary | ICD-10-CM

## 2023-03-07 LAB
ALBUMIN SERPL BCP-MCNC: 3.8 G/DL (ref 3.5–5.2)
ALP SERPL-CCNC: 63 U/L (ref 55–135)
ALT SERPL W/O P-5'-P-CCNC: 12 U/L (ref 10–44)
AMPHET+METHAMPHET UR QL: NEGATIVE
ANION GAP SERPL CALC-SCNC: 5 MMOL/L (ref 8–16)
APAP SERPL-MCNC: <3 UG/ML (ref 10–20)
AST SERPL-CCNC: 15 U/L (ref 10–40)
B-HCG UR QL: NEGATIVE
BARBITURATES UR QL SCN>200 NG/ML: NEGATIVE
BASOPHILS # BLD AUTO: 0.03 K/UL (ref 0–0.2)
BASOPHILS NFR BLD: 0.3 % (ref 0–1.9)
BENZODIAZ UR QL SCN>200 NG/ML: NEGATIVE
BILIRUB SERPL-MCNC: 0.2 MG/DL (ref 0.1–1)
BILIRUB UR QL STRIP: NEGATIVE
BUN SERPL-MCNC: 18 MG/DL (ref 6–20)
BZE UR QL SCN: NEGATIVE
CALCIUM SERPL-MCNC: 9 MG/DL (ref 8.7–10.5)
CANNABINOIDS UR QL SCN: NEGATIVE
CHLORIDE SERPL-SCNC: 108 MMOL/L (ref 95–110)
CLARITY UR: CLEAR
CO2 SERPL-SCNC: 27 MMOL/L (ref 23–29)
COLOR UR: COLORLESS
CREAT SERPL-MCNC: 0.9 MG/DL (ref 0.5–1.4)
CREAT UR-MCNC: 45.6 MG/DL (ref 15–325)
CTP QC/QA: YES
DIFFERENTIAL METHOD: ABNORMAL
EOSINOPHIL # BLD AUTO: 0.1 K/UL (ref 0–0.5)
EOSINOPHIL NFR BLD: 0.7 % (ref 0–8)
ERYTHROCYTE [DISTWIDTH] IN BLOOD BY AUTOMATED COUNT: 11.3 % (ref 11.5–14.5)
EST. GFR  (NO RACE VARIABLE): >60 ML/MIN/1.73 M^2
ETHANOL SERPL-MCNC: <10 MG/DL
GLUCOSE SERPL-MCNC: 97 MG/DL (ref 70–110)
GLUCOSE UR QL STRIP: NEGATIVE
HCT VFR BLD AUTO: 35.6 % (ref 37–48.5)
HCV AB SERPL QL IA: POSITIVE
HGB BLD-MCNC: 12 G/DL (ref 12–16)
HGB UR QL STRIP: NEGATIVE
HIV 1+2 AB+HIV1 P24 AG SERPL QL IA: NEGATIVE
IMM GRANULOCYTES # BLD AUTO: 0.03 K/UL (ref 0–0.04)
IMM GRANULOCYTES NFR BLD AUTO: 0.3 % (ref 0–0.5)
KETONES UR QL STRIP: NEGATIVE
LEUKOCYTE ESTERASE UR QL STRIP: NEGATIVE
LYMPHOCYTES # BLD AUTO: 2 K/UL (ref 1–4.8)
LYMPHOCYTES NFR BLD: 19.4 % (ref 18–48)
MCH RBC QN AUTO: 31.6 PG (ref 27–31)
MCHC RBC AUTO-ENTMCNC: 33.7 G/DL (ref 32–36)
MCV RBC AUTO: 94 FL (ref 82–98)
METHADONE UR QL SCN>300 NG/ML: NEGATIVE
MONOCYTES # BLD AUTO: 0.3 K/UL (ref 0.3–1)
MONOCYTES NFR BLD: 2.9 % (ref 4–15)
NEUTROPHILS # BLD AUTO: 7.8 K/UL (ref 1.8–7.7)
NEUTROPHILS NFR BLD: 76.4 % (ref 38–73)
NITRITE UR QL STRIP: NEGATIVE
NRBC BLD-RTO: 0 /100 WBC
OPIATES UR QL SCN: NEGATIVE
PCP UR QL SCN>25 NG/ML: NEGATIVE
PH UR STRIP: 8 [PH] (ref 5–8)
PLATELET # BLD AUTO: 250 K/UL (ref 150–450)
PMV BLD AUTO: 9.6 FL (ref 9.2–12.9)
POTASSIUM SERPL-SCNC: 4.1 MMOL/L (ref 3.5–5.1)
PROT SERPL-MCNC: 6.5 G/DL (ref 6–8.4)
PROT UR QL STRIP: NEGATIVE
RBC # BLD AUTO: 3.8 M/UL (ref 4–5.4)
SODIUM SERPL-SCNC: 140 MMOL/L (ref 136–145)
SP GR UR STRIP: 1.01 (ref 1–1.03)
TOXICOLOGY INFORMATION: NORMAL
TSH SERPL DL<=0.005 MIU/L-ACNC: 0.92 UIU/ML (ref 0.4–4)
URN SPEC COLLECT METH UR: ABNORMAL
UROBILINOGEN UR STRIP-ACNC: NEGATIVE EU/DL
WBC # BLD AUTO: 10.18 K/UL (ref 3.9–12.7)

## 2023-03-07 PROCEDURE — 80143 DRUG ASSAY ACETAMINOPHEN: CPT | Performed by: EMERGENCY MEDICINE

## 2023-03-07 PROCEDURE — 81003 URINALYSIS AUTO W/O SCOPE: CPT | Mod: 59 | Performed by: EMERGENCY MEDICINE

## 2023-03-07 PROCEDURE — 80053 COMPREHEN METABOLIC PANEL: CPT | Performed by: EMERGENCY MEDICINE

## 2023-03-07 PROCEDURE — 86803 HEPATITIS C AB TEST: CPT | Performed by: EMERGENCY MEDICINE

## 2023-03-07 PROCEDURE — 81025 URINE PREGNANCY TEST: CPT | Performed by: EMERGENCY MEDICINE

## 2023-03-07 PROCEDURE — 85025 COMPLETE CBC W/AUTO DIFF WBC: CPT | Performed by: EMERGENCY MEDICINE

## 2023-03-07 PROCEDURE — 25000003 PHARM REV CODE 250: Performed by: EMERGENCY MEDICINE

## 2023-03-07 PROCEDURE — 84443 ASSAY THYROID STIM HORMONE: CPT | Performed by: EMERGENCY MEDICINE

## 2023-03-07 PROCEDURE — 87389 HIV-1 AG W/HIV-1&-2 AB AG IA: CPT | Performed by: EMERGENCY MEDICINE

## 2023-03-07 PROCEDURE — 93010 EKG 12-LEAD: ICD-10-PCS | Mod: ,,, | Performed by: INTERNAL MEDICINE

## 2023-03-07 PROCEDURE — 93010 ELECTROCARDIOGRAM REPORT: CPT | Mod: ,,, | Performed by: INTERNAL MEDICINE

## 2023-03-07 PROCEDURE — 80307 DRUG TEST PRSMV CHEM ANLYZR: CPT | Performed by: EMERGENCY MEDICINE

## 2023-03-07 PROCEDURE — 82077 ASSAY SPEC XCP UR&BREATH IA: CPT | Performed by: EMERGENCY MEDICINE

## 2023-03-07 PROCEDURE — 99285 EMERGENCY DEPT VISIT HI MDM: CPT

## 2023-03-07 PROCEDURE — 93005 ELECTROCARDIOGRAM TRACING: CPT

## 2023-03-07 RX ORDER — TRAZODONE HYDROCHLORIDE 50 MG/1
50 TABLET ORAL NIGHTLY
COMMUNITY
Start: 2023-02-22 | End: 2024-02-09 | Stop reason: SDUPTHER

## 2023-03-07 RX ORDER — GABAPENTIN 600 MG/1
600 TABLET ORAL 3 TIMES DAILY
COMMUNITY
Start: 2023-02-28 | End: 2024-02-09 | Stop reason: SDUPTHER

## 2023-03-07 RX ORDER — CLONAZEPAM 0.5 MG/1
1 TABLET ORAL
Status: COMPLETED | OUTPATIENT
Start: 2023-03-07 | End: 2023-03-07

## 2023-03-07 RX ORDER — GABAPENTIN 300 MG/1
600 CAPSULE ORAL ONCE
Status: COMPLETED | OUTPATIENT
Start: 2023-03-07 | End: 2023-03-07

## 2023-03-07 RX ORDER — CLONAZEPAM 1 MG/1
1 TABLET ORAL 3 TIMES DAILY
Status: ON HOLD | COMMUNITY
Start: 2023-02-28 | End: 2023-03-13 | Stop reason: HOSPADM

## 2023-03-07 RX ADMIN — GABAPENTIN 600 MG: 300 CAPSULE ORAL at 02:03

## 2023-03-07 RX ADMIN — CLONAZEPAM 1 MG: 0.5 TABLET ORAL at 02:03

## 2023-03-07 NOTE — ED NOTES
Pt belongings List:  Bag of medication: 5 bottles one box  One white shirt  One pair of navy blue sweat pants  One pair of light blue shoes  One tan bra    Valuables Bag:   One multicolored wooden necklace   One white vape  One pair of red headphones

## 2023-03-07 NOTE — ED PROVIDER NOTES
"Encounter Date: 3/7/2023    SCRIBE #1 NOTE: I, Harshad Cleaning, am scribing for, and in the presence of,  Carolee Mcdowell MD.     History     Chief Complaint   Patient presents with    Delusional     Pt in police custody. Per patient and police escort pt has been stating that she believes people are out to kill her. "I feel like the people were out to kill me at the place I was at. I feel in my soul and my heart and my body." "The girl next to me is trying to control me." Denies hearing any voices or suicidal ideation. Resides at the [facility] x 3 months.      Time seen by provider: 10:50 AM    Katelynn Crabtree is a 32 y.o. female, with a PMHx of bipolar disorder and schizoaffective disorder, who presents to the ED for psychiatric evaluation. The patient was brought in police custody. The patient and police escort report that the patient has been stating that she believes people are out to kill her. The patient states that for the past three months she has been residing at the [facility], she says she feels like the two people who sleep around her have been trying to kill her.  She believes that they are trying to give her hep C by getting into her body.  She states that they have been doing this for the past 3 months.  She denies hearing any voices or suicidal ideations.  Denies homicidal ideation.  She notes that she has prescribed medications of Clonazepam, Gabapentin, and Trazodone.  She denies medication noncompliance.  This is the extent of the patient's complaints today in the Emergency Department.      The history is provided by the patient, the police and medical records.   Review of patient's allergies indicates:   Allergen Reactions    Hydroxyzine Swelling     Pt says her throat swells up     Past Medical History:   Diagnosis Date    Bipolar disorder     Depression     History of psychiatric hospitalization     Hx of psychiatric care     Marsha     Psychiatric exam requested by authority     Psychiatric " problem     Schizoaffective disorder     Therapy      Past Surgical History:   Procedure Laterality Date     SECTION       Family History   Problem Relation Age of Onset    Alcohol abuse Mother     Bipolar disorder Mother     Alcohol abuse Maternal Aunt      Social History     Tobacco Use    Smoking status: Former     Packs/day: 1.00     Years: 18.00     Pack years: 18.00     Types: Cigarettes     Start date: 1998    Smokeless tobacco: Never    Tobacco comments:     on the hazards of smoking   Substance Use Topics    Alcohol use: No     Comment: hx of drinking daquiris; sober 15 months    Drug use: Not Currently     Types: Methamphetamines, Heroin     Comment: Crystal Meth; sober 15 months     Review of Systems   Constitutional:  Negative for chills and fever.   HENT:  Negative for congestion and rhinorrhea.    Respiratory:  Negative for chest tightness and shortness of breath.    Cardiovascular:  Negative for chest pain and palpitations.   Gastrointestinal:  Negative for abdominal pain, diarrhea, nausea and vomiting.   Genitourinary:  Negative for dysuria and flank pain.   Musculoskeletal:  Negative for back pain and neck pain.   Skin:  Negative for color change and wound.   Neurological:  Negative for dizziness and headaches.   Psychiatric/Behavioral:  Negative for hallucinations and suicidal ideas.         Paranoid delusions.     Physical Exam     Initial Vitals [23 1035]   BP Pulse Resp Temp SpO2   (!) 102/92 100 18 98.5 °F (36.9 °C) 97 %      MAP       --         Physical Exam    Nursing note and vitals reviewed.  Constitutional: She appears well-developed and well-nourished.   HENT:   Head: Normocephalic and atraumatic.   Eyes: Conjunctivae are normal.   Pulmonary/Chest: No respiratory distress.   Musculoskeletal:         General: Normal range of motion.     Neurological: She is alert and oriented to person, place, and time.   Skin: Skin is warm and dry. Capillary refill takes less than 2  seconds.   Psychiatric:   Paranoid delusions. Inappropriate thought content. Negative for SI and HI       ED Course   Procedures  Labs Reviewed   CBC W/ AUTO DIFFERENTIAL - Abnormal; Notable for the following components:       Result Value    RBC 3.80 (*)     Hematocrit 35.6 (*)     MCH 31.6 (*)     RDW 11.3 (*)     Gran # (ANC) 7.8 (*)     Gran % 76.4 (*)     Mono % 2.9 (*)     All other components within normal limits   COMPREHENSIVE METABOLIC PANEL - Abnormal; Notable for the following components:    Anion Gap 5 (*)     All other components within normal limits   URINALYSIS, REFLEX TO URINE CULTURE - Abnormal; Notable for the following components:    Color, UA Colorless (*)     All other components within normal limits    Narrative:     Specimen Source->Urine   ACETAMINOPHEN LEVEL - Abnormal; Notable for the following components:    Acetaminophen (Tylenol), Serum <3.0 (*)     All other components within normal limits   HEPATITIS C ANTIBODY - Abnormal; Notable for the following components:    Hepatitis C Ab Positive (*)     All other components within normal limits    Narrative:     Release to patient->Immediate   TSH   DRUG SCREEN PANEL, URINE EMERGENCY    Narrative:     Specimen Source->Urine   ALCOHOL,MEDICAL (ETHANOL)   HIV 1 / 2 ANTIBODY    Narrative:     Release to patient->Immediate   URINALYSIS, REFLEX TO URINE CULTURE   POCT URINE PREGNANCY     EKG Readings: (Independently Interpreted)   11:05AM:  Rate of 83.  Normal sinus rhythm.  Normal axis.  Normal intervals.  No ST or ischemic changes.     Imaging Results    None          Medications   gabapentin capsule 600 mg (has no administration in time range)   clonazePAM tablet 1 mg (has no administration in time range)     Medical Decision Making:   History:   Old Medical Records: I decided to obtain old medical records.  Old Records Summarized: other records and records from another hospital.  Initial Assessment:   10:50AM:  Patient is a 32 year female who  presents to the emergency department with paranoid delusions.  She believes that people are out to get her and somehow getting into her body to give her hepatitis-C.  Patient otherwise denies any SI or HI.  However I am concerned that her paranoid delusions has made her gravely disabled.  Will plan for labs for medical clearance and pec for psychiatric evaluation.  Will continue to follow and reassess     1:55PM:  Patient is medically cleared for psychiatric evaluation.  Awaiting acceptance at this time.       Scribe Attestation:   Scribe #1: I performed the above scribed service and the documentation accurately describes the services I performed. I attest to the accuracy of the note.         Medically cleared for psychiatry placement: 3/7/2023  1:50 PM  Physician Attestation for Scribe: I, Carolee Mcdowell, reviewed documentation as scribed in my presence, which is both accurate and complete.        Clinical Impression:   Final diagnoses:  [Z00.8] Medical clearance for psychiatric admission  [F22] Paranoid delusion (Primary)        ED Disposition Condition    Transfer to Psych Facility Stable          ED Prescriptions    None       Follow-up Information    None          Carolee Mcdowell MD  03/07/23 [id number]

## 2023-03-08 PROBLEM — B18.2 CHRONIC HEPATITIS C VIRUS INFECTION: Status: ACTIVE | Noted: 2023-03-08

## 2023-03-08 PROBLEM — G62.9 NEUROPATHY: Status: ACTIVE | Noted: 2023-03-08

## 2024-02-06 PROBLEM — L20.9 ATOPIC DERMATITIS: Status: ACTIVE | Noted: 2018-03-31

## 2024-02-06 PROBLEM — F11.21 OPIOID USE DISORDER, SEVERE, IN EARLY REMISSION: Status: ACTIVE | Noted: 2021-07-03

## 2024-02-06 PROBLEM — Z87.898 HISTORY OF SEXUAL VIOLENCE: Status: ACTIVE | Noted: 2023-05-07

## 2024-02-06 PROBLEM — B97.7 HUMAN PAPILLOMA VIRUS INFECTION: Status: ACTIVE | Noted: 2018-03-31

## 2024-02-06 PROBLEM — F13.20 BENZODIAZEPINE DEPENDENCE: Status: ACTIVE | Noted: 2023-03-01

## 2024-02-06 PROBLEM — F43.10 PTSD (POST-TRAUMATIC STRESS DISORDER): Status: ACTIVE | Noted: 2023-03-01

## 2024-02-06 PROBLEM — K73.9 CHRONIC HEPATITIS: Status: ACTIVE | Noted: 2018-03-31

## 2024-02-06 PROBLEM — F90.9 ATTENTION DEFICIT HYPERACTIVITY DISORDER (ADHD): Status: ACTIVE | Noted: 2023-07-04

## 2024-03-03 ENCOUNTER — HOSPITAL ENCOUNTER (EMERGENCY)
Facility: HOSPITAL | Age: 34
Discharge: HOME | End: 2024-03-03
Attending: EMERGENCY MEDICINE
Payer: MEDICAID

## 2024-03-03 VITALS
HEIGHT: 63 IN | OXYGEN SATURATION: 97 % | TEMPERATURE: 98 F | HEART RATE: 74 BPM | BODY MASS INDEX: 23.92 KG/M2 | DIASTOLIC BLOOD PRESSURE: 74 MMHG | WEIGHT: 135 LBS | RESPIRATION RATE: 15 BRPM | SYSTOLIC BLOOD PRESSURE: 127 MMHG

## 2024-03-03 DIAGNOSIS — R07.89 ANTERIOR CHEST WALL PAIN: ICD-10-CM

## 2024-03-03 DIAGNOSIS — R07.9 CHEST PAIN: ICD-10-CM

## 2024-03-03 LAB
B-HCG UR QL: NEGATIVE
CTP QC/QA: YES

## 2024-03-03 PROCEDURE — 93005 ELECTROCARDIOGRAM TRACING: CPT

## 2024-03-03 PROCEDURE — 93010 ELECTROCARDIOGRAM REPORT: CPT | Mod: ,,, | Performed by: INTERNAL MEDICINE

## 2024-03-03 PROCEDURE — 99284 EMERGENCY DEPT VISIT MOD MDM: CPT | Mod: 25

## 2024-03-03 PROCEDURE — 81025 URINE PREGNANCY TEST: CPT | Performed by: EMERGENCY MEDICINE

## 2024-03-03 RX ORDER — IBUPROFEN 600 MG/1
600 TABLET ORAL EVERY 6 HOURS PRN
Qty: 20 TABLET | Refills: 0 | Status: SHIPPED | OUTPATIENT
Start: 2024-03-03

## 2024-03-03 NOTE — ED PROVIDER NOTES
"Encounter Date: 3/3/2024    SCRIBE #1 NOTE: I, RANDI PAOLO, am scribing for, and in the presence of,  Kristian George MD. I have scribed the following portions of the note - Other sections scribed: HPI, ROS.       History     Chief Complaint   Patient presents with    Chest Pain     Pt presents to ED c/o left anterior chest pain and radiates to right side onset x 20 mins pta, described as tightness, intermittent.  Pt reports pain started while working this morning, reaching above her head to scan items and walking up and down a ladder.  Pt reports dizziness ambulating to triage.  Strong steady gait noted while pt walked to triage.   Denies n/v, sob, headache, cough, congestion, fever or any other symptoms.  Denies taking medication for symptoms.  Denies pmh  Pain 5/10.      33-year-old female, with no pertinent PMHx, presents to the ED with a chief complaint of CP onset this morning. 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 She has reproducible, generalized chest tightness that initially felt "sharp and stabbing". The CP worsens with taking deep breaths and lifting her arms. Denies any Hx of DVT or cardiac/lung issues. Further denies any recent long travels, surgeries, estrogen use, or possibility of pregnancy (tubal ligation). She notes that she used to smoke, and denies any illicit drug use. No other exacerbating or alleviating factors. Patient denies cough, hemoptysis, shortness of breath, fever, chills, abdominal pain, nausea, vomiting, diarrhea, dysuria, headaches, congestion, sore throat, arm or leg trouble, leg swelling, eye pain, ear pain, rash, or other associated symptoms.       The history is provided by the patient. No  was used.     Review of patient's allergies indicates:   Allergen Reactions    Hydroxyzine Swelling     Pt says her throat swells up     Past Medical History:   Diagnosis Date    Attention deficit " hyperactivity disorder (ADHD) 2023    Bipolar disorder     Depression     History of psychiatric hospitalization     Hx of psychiatric care     Marsha     Psychiatric exam requested by authority     Psychiatric problem     Schizoaffective disorder     Therapy      Past Surgical History:   Procedure Laterality Date     SECTION       Family History   Problem Relation Age of Onset    Alcohol abuse Mother     Bipolar disorder Mother     Alcohol abuse Maternal Aunt      Social History     Tobacco Use    Smoking status: Former     Current packs/day: 1.00     Average packs/day: 1 pack/day for 25.5 years (25.5 ttl pk-yrs)     Types: Cigarettes     Start date: 1998    Smokeless tobacco: Never    Tobacco comments:     on the hazards of smoking   Substance Use Topics    Alcohol use: No     Comment: hx of drinking daquiris; sober 15 months    Drug use: Not Currently     Types: Methamphetamines, Heroin     Comment: Crystal Meth; sober 15 months     Review of Systems   Constitutional:  Negative for chills, diaphoresis and fever.   HENT:  Negative for congestion, ear pain and sore throat.    Eyes:  Negative for pain.   Respiratory:  Negative for cough and shortness of breath.    Cardiovascular:  Positive for chest pain. Negative for leg swelling.   Gastrointestinal:  Negative for abdominal pain, diarrhea, nausea and vomiting.   Genitourinary:  Negative for dysuria.   Musculoskeletal:  Negative for back pain.        (-) Arm or leg trouble.    Skin:  Negative for rash.   Neurological:  Negative for headaches.   Psychiatric/Behavioral:  Negative for confusion.        Physical Exam     Initial Vitals [24 0626]   BP Pulse Resp Temp SpO2   111/67 74 16 98.2 °F (36.8 °C) 97 %      MAP       --         Physical Exam  The patient was examined specifically for the following:   General:No significant distress, Good color, Warm and dry. Head and neck:Scalp atraumatic, Neck supple. Neurological:Appropriate conversation,  Gross motor deficits. Eyes:Conjugate gaze, Clear corneas. ENT: No epistaxis. Cardiac: Regular rate and rhythm, Grossly normal heart tones. Pulmonary: Wheezing, Rales. Gastrointestinal: Abdominal tenderness, Abdominal distention. Musculoskeletal: Extremity deformity, Apparent pain with range of motion of the joints. Skin: Rash.   The findings on examination were normal except for the following:  Lungs are clear.  The heart tones are normal.  The abdomen is soft.  Extremities are nontender there is no apparent pain with range of motion of any joints.  There is chest pain with anterior distraction of the left arm across the anterior chest.  There is left-sided chest pain that occurs with  raising the left arm over the head.  Lungs are clear.  The heart tones are normal.  The abdomen is soft.  There is no swelling or tenderness of the lower extremities.  There is no clinical evidence of respiratory distress.  There is no tachycardia or tachypnea.  ED Course   Procedures  Labs Reviewed   POCT URINE PREGNANCY     EKG Readings: (Independently Interpreted)   This patient is in a normal sinus rhythm heart rate of 67 there are no significant ST segment or T-wave changes.  This is a normal EKG.  Axis is normal.  Intervals are normal.       Imaging Results              X-Ray Chest 1 View (Final result)  Result time 03/03/24 09:23:09      Final result by Luis A Biggs MD (03/03/24 09:23:09)                   Impression:      No evidence of acute cardiopulmonary disease.      Electronically signed by: Luis A Biggs MD  Date:    03/03/2024  Time:    09:23               Narrative:    EXAMINATION:  XR CHEST 1 VIEW    CLINICAL HISTORY:  Other chest pain    TECHNIQUE:  Frontal view of the chest was performed.    COMPARISON:  04/27/2020    FINDINGS:  The cardiomediastinal silhouette is normal in size and midline. Pulmonary vascularity appears within normal limits.    The lungs appear clear without confluent pulmonary parenchymal  opacity. No pleural fluid or pneumothorax.                                       Medications - No data to display  Medical Decision Making  Amount and/or Complexity of Data Reviewed  Labs: ordered. Decision-making details documented in ED Course.  Radiology: ordered. Decision-making details documented in ED Course.  ECG/medicine tests: ordered and independent interpretation performed. Decision-making details documented in ED Course.    Risk  Prescription drug management.    Given the above this patient presents emergency room with sharp stabbing left-sided chest pain that occurred when she was reaching over her head to put supplies on a shelf.  The pain is worse with movement.  It can be reproduced during the physical examination with movement of the left upper extremity.  I believe this is chest wall pain.  The patient has a normal EKG and a negative chest x-ray.  She has not short of breath or tachycardic.  She is perc negative.  I carefully considered pulmonary embolus.  I feel it is unlikely.  There is no leg swelling or tenderness.  I will discharge on ibuprofen to follow up with primary care.        Scribe Attestation:   Scribe #1: I performed the above scribed service and the documentation accurately describes the services I performed. I attest to the accuracy of the note.                             Please note that the documentation on this chart was provided by the scribe above on the date of service noted above, and that the documentation in the chart accurately reflects the work and decisions made by me alone.  Signed, Dr. George  Clinical Impression:  Final diagnoses:  [R07.9] Chest pain  [R07.89] Anterior chest wall pain          ED Disposition Condition    Discharge Stable          ED Prescriptions       Medication Sig Dispense Start Date End Date Auth. Provider    ibuprofen (ADVIL,MOTRIN) 600 MG tablet Take 1 tablet (600 mg total) by mouth every 6 (six) hours as needed. 20 tablet 3/3/2024 --  Kristian George MD          Follow-up Information       Follow up With Specialties Details Why Contact Info    Tonya Reeves MD Family Medicine In 1 week  [address]  [facility] [address]  [phone]               Kristian George MD  03/03/24 1558

## 2024-03-03 NOTE — DISCHARGE INSTRUCTIONS
Please return immediately if you get worse or if new problems develop.  Ibuprofen for pain and discomfort.  Rest.  Please follow up with your primary care doctor this week.

## 2024-03-03 NOTE — Clinical Note
"Katelynn Macedo"Bro was seen and treated in our emergency department on 3/3/2024.  She may return to work on 03/04/2024.       If you have any questions or concerns, please don't hesitate to call.      Kristian George MD"

## 2024-03-03 NOTE — ED TRIAGE NOTES
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 Reports pain has significantly decreased but is reproducable with movement.

## 2024-03-04 LAB
OHS QRS DURATION: 82 MS
OHS QTC CALCULATION: 416 MS

## 2024-12-09 ENCOUNTER — HOSPITAL ENCOUNTER (EMERGENCY)
Facility: HOSPITAL | Age: 34
Discharge: PSYCHIATRIC OR SUBSTANCE USE FACILITY | End: 2024-12-09
Attending: EMERGENCY MEDICINE
Payer: MEDICAID

## 2024-12-09 VITALS
BODY MASS INDEX: 25.69 KG/M2 | HEART RATE: 100 BPM | DIASTOLIC BLOOD PRESSURE: 90 MMHG | HEIGHT: 63 IN | TEMPERATURE: 97 F | OXYGEN SATURATION: 98 % | WEIGHT: 145 LBS | SYSTOLIC BLOOD PRESSURE: 130 MMHG | RESPIRATION RATE: 20 BRPM

## 2024-12-09 DIAGNOSIS — F23 ACUTE PSYCHOSIS: Primary | ICD-10-CM

## 2024-12-09 DIAGNOSIS — Z00.8 MEDICAL CLEARANCE FOR PSYCHIATRIC ADMISSION: ICD-10-CM

## 2024-12-09 LAB
ALBUMIN SERPL BCP-MCNC: 4.2 G/DL (ref 3.5–5.2)
ALP SERPL-CCNC: 74 U/L (ref 40–150)
ALT SERPL W/O P-5'-P-CCNC: 29 U/L (ref 10–44)
AMPHET+METHAMPHET UR QL: NEGATIVE
ANION GAP SERPL CALC-SCNC: 16 MMOL/L (ref 8–16)
APAP SERPL-MCNC: <3 UG/ML (ref 10–20)
AST SERPL-CCNC: 39 U/L (ref 10–40)
BACTERIA #/AREA URNS HPF: NORMAL /HPF
BARBITURATES UR QL SCN>200 NG/ML: NEGATIVE
BASOPHILS # BLD AUTO: 0.04 K/UL (ref 0–0.2)
BASOPHILS NFR BLD: 0.4 % (ref 0–1.9)
BENZODIAZ UR QL SCN>200 NG/ML: NEGATIVE
BILIRUB SERPL-MCNC: 0.3 MG/DL (ref 0.1–1)
BILIRUB UR QL STRIP: NEGATIVE
BUN SERPL-MCNC: 2 MG/DL (ref 6–20)
BZE UR QL SCN: NEGATIVE
CALCIUM SERPL-MCNC: 9.8 MG/DL (ref 8.7–10.5)
CANNABINOIDS UR QL SCN: NEGATIVE
CHLORIDE SERPL-SCNC: 98 MMOL/L (ref 95–110)
CLARITY UR: ABNORMAL
CO2 SERPL-SCNC: 20 MMOL/L (ref 23–29)
COLOR UR: YELLOW
CREAT SERPL-MCNC: 0.9 MG/DL (ref 0.5–1.4)
CREAT UR-MCNC: 41.7 MG/DL (ref 15–325)
DIFFERENTIAL METHOD BLD: ABNORMAL
EOSINOPHIL # BLD AUTO: 0 K/UL (ref 0–0.5)
EOSINOPHIL NFR BLD: 0 % (ref 0–8)
ERYTHROCYTE [DISTWIDTH] IN BLOOD BY AUTOMATED COUNT: 11.1 % (ref 11.5–14.5)
EST. GFR  (NO RACE VARIABLE): >60 ML/MIN/1.73 M^2
ETHANOL SERPL-MCNC: <10 MG/DL
GLUCOSE SERPL-MCNC: 103 MG/DL (ref 70–110)
GLUCOSE UR QL STRIP: NEGATIVE
HCG INTACT+B SERPL-ACNC: <1.2 MIU/ML
HCT VFR BLD AUTO: 37.7 % (ref 37–48.5)
HGB BLD-MCNC: 13.3 G/DL (ref 12–16)
HGB UR QL STRIP: NEGATIVE
IMM GRANULOCYTES # BLD AUTO: 0.03 K/UL (ref 0–0.04)
IMM GRANULOCYTES NFR BLD AUTO: 0.3 % (ref 0–0.5)
KETONES UR QL STRIP: NEGATIVE
LEUKOCYTE ESTERASE UR QL STRIP: ABNORMAL
LYMPHOCYTES # BLD AUTO: 2.4 K/UL (ref 1–4.8)
LYMPHOCYTES NFR BLD: 24.5 % (ref 18–48)
MCH RBC QN AUTO: 32.2 PG (ref 27–31)
MCHC RBC AUTO-ENTMCNC: 35.3 G/DL (ref 32–36)
MCV RBC AUTO: 91 FL (ref 82–98)
METHADONE UR QL SCN>300 NG/ML: NEGATIVE
MICROSCOPIC COMMENT: NORMAL
MONOCYTES # BLD AUTO: 1 K/UL (ref 0.3–1)
MONOCYTES NFR BLD: 10.3 % (ref 4–15)
NEUTROPHILS # BLD AUTO: 6.2 K/UL (ref 1.8–7.7)
NEUTROPHILS NFR BLD: 64.5 % (ref 38–73)
NITRITE UR QL STRIP: NEGATIVE
NRBC BLD-RTO: 0 /100 WBC
OPIATES UR QL SCN: NEGATIVE
PCP UR QL SCN>25 NG/ML: NEGATIVE
PH UR STRIP: 6 [PH] (ref 5–8)
PLATELET # BLD AUTO: 258 K/UL (ref 150–450)
PMV BLD AUTO: 9.3 FL (ref 9.2–12.9)
POTASSIUM SERPL-SCNC: 3.3 MMOL/L (ref 3.5–5.1)
PROT SERPL-MCNC: 7.6 G/DL (ref 6–8.4)
PROT UR QL STRIP: NEGATIVE
RBC # BLD AUTO: 4.13 M/UL (ref 4–5.4)
RBC #/AREA URNS HPF: 2 /HPF (ref 0–4)
SODIUM SERPL-SCNC: 134 MMOL/L (ref 136–145)
SP GR UR STRIP: <1.005 (ref 1–1.03)
SQUAMOUS #/AREA URNS HPF: 12 /HPF
TOXICOLOGY INFORMATION: NORMAL
TSH SERPL DL<=0.005 MIU/L-ACNC: 1.09 UIU/ML (ref 0.4–4)
URN SPEC COLLECT METH UR: ABNORMAL
UROBILINOGEN UR STRIP-ACNC: NEGATIVE EU/DL
WBC # BLD AUTO: 9.59 K/UL (ref 3.9–12.7)
WBC #/AREA URNS HPF: 4 /HPF (ref 0–5)

## 2024-12-09 PROCEDURE — 81000 URINALYSIS NONAUTO W/SCOPE: CPT | Mod: 59 | Performed by: EMERGENCY MEDICINE

## 2024-12-09 PROCEDURE — 63600175 PHARM REV CODE 636 W HCPCS: Performed by: EMERGENCY MEDICINE

## 2024-12-09 PROCEDURE — 84443 ASSAY THYROID STIM HORMONE: CPT | Performed by: EMERGENCY MEDICINE

## 2024-12-09 PROCEDURE — 63600175 PHARM REV CODE 636 W HCPCS: Performed by: STUDENT IN AN ORGANIZED HEALTH CARE EDUCATION/TRAINING PROGRAM

## 2024-12-09 PROCEDURE — 80307 DRUG TEST PRSMV CHEM ANLYZR: CPT | Performed by: EMERGENCY MEDICINE

## 2024-12-09 PROCEDURE — 96372 THER/PROPH/DIAG INJ SC/IM: CPT | Performed by: STUDENT IN AN ORGANIZED HEALTH CARE EDUCATION/TRAINING PROGRAM

## 2024-12-09 PROCEDURE — 93005 ELECTROCARDIOGRAM TRACING: CPT

## 2024-12-09 PROCEDURE — 84702 CHORIONIC GONADOTROPIN TEST: CPT | Performed by: EMERGENCY MEDICINE

## 2024-12-09 PROCEDURE — 82077 ASSAY SPEC XCP UR&BREATH IA: CPT | Performed by: EMERGENCY MEDICINE

## 2024-12-09 PROCEDURE — 80143 DRUG ASSAY ACETAMINOPHEN: CPT | Performed by: EMERGENCY MEDICINE

## 2024-12-09 PROCEDURE — 93010 ELECTROCARDIOGRAM REPORT: CPT | Mod: ,,, | Performed by: INTERNAL MEDICINE

## 2024-12-09 PROCEDURE — 85025 COMPLETE CBC W/AUTO DIFF WBC: CPT | Performed by: EMERGENCY MEDICINE

## 2024-12-09 PROCEDURE — 96372 THER/PROPH/DIAG INJ SC/IM: CPT | Performed by: EMERGENCY MEDICINE

## 2024-12-09 PROCEDURE — 99285 EMERGENCY DEPT VISIT HI MDM: CPT | Mod: 25

## 2024-12-09 PROCEDURE — 80053 COMPREHEN METABOLIC PANEL: CPT | Performed by: EMERGENCY MEDICINE

## 2024-12-09 RX ORDER — LORAZEPAM 2 MG/ML
2 INJECTION INTRAMUSCULAR
Status: COMPLETED | OUTPATIENT
Start: 2024-12-09 | End: 2024-12-09

## 2024-12-09 RX ORDER — DROPERIDOL 2.5 MG/ML
2 INJECTION, SOLUTION INTRAMUSCULAR; INTRAVENOUS ONCE
Status: DISCONTINUED | OUTPATIENT
Start: 2024-12-09 | End: 2024-12-09

## 2024-12-09 RX ORDER — HALOPERIDOL 5 MG/ML
5 INJECTION INTRAMUSCULAR
Status: COMPLETED | OUTPATIENT
Start: 2024-12-09 | End: 2024-12-09

## 2024-12-09 RX ADMIN — LORAZEPAM 2 MG: 2 INJECTION INTRAMUSCULAR at 03:12

## 2024-12-09 RX ADMIN — HALOPERIDOL LACTATE 5 MG: 5 INJECTION, SOLUTION INTRAMUSCULAR at 03:12

## 2024-12-09 RX ADMIN — LORAZEPAM 2 MG: 2 INJECTION INTRAMUSCULAR; INTRAVENOUS at 10:12

## 2024-12-09 NOTE — ED PROVIDER NOTES
"SCRIBE #1 NOTE: I, Gema Holland, am scribing for, and in the presence of,  Rell Contreras MD. I have scribed the following portions of the note - Other sections scribed: HPI,ROS,PE.           EM PHYSICIAN NOTE       This patient presents with a complaint of   Chief Complaint   Patient presents with    Psychiatric Evaluation     Pt arrived via ems, pt chief complaint is a Psychiatric evaluation. Pt screaming triage saying she is going to die, pt displaying manic behavior with scratches all over body.          Source of HPI & ROS: patient and EMS personnel    HPI: Katelynn Crabtree is a 34 y.o. female, with a PMHx of ADHD, bipolar disorder, depression, nicolás, schizoaffective disorder, who presents to the ED for psychiatric evaluation. Independent historian: EMS reports they were called to the scene by patient's  for patient exhibiting dyspnea. When they arrived the patient was outside the building in current state stating "I do not want to die". EMS reports she walked into ambulance without assistance. Patient has visible self inflicted abrasions to skin. No other exacerbating or alleviating factors. Patient denies illicit drug usage. There are no other complaints at this time. Patient reports known allergy to hydroxyzine.         Review of patient's allergies indicates:   Allergen Reactions    Hydroxyzine Swelling     Pt says her throat swells up              Pertinent REVIEW of SYSTEMS  Unable due to acuity and mental status       The nurse's notes and triage vital signs were reviewed.    PHYSICAL EXAMINATION    ED Triage Vitals [12/09/24 1448]   Encounter Vitals Group      BP (!) 148/90      Systolic BP Percentile       Diastolic BP Percentile       Pulse (!) 120      Resp (!) 24      Temp 98 °F (36.7 °C)      Temp Source Oral      SpO2 100 %      Weight 145 lb      Height 5' 3"      Head Circumference       Peak Flow       Pain Score       Pain Loc       Pain Education       Exclude from Growth " Chart      Vital signs and Pulse Ox reviewed in clinical context. Abnormalities noted:  Tachycardic which has improved with out treatment  Body mass index is 25.69 kg/m².  Pt's level of consciousness is Awake, Alert, and agitated , and the patient is in mild distress.  Skin: warm, pink and dry.  Capillary refill is less than 2 seconds.  Mucosa: normal  Head and Neck: no JVD, neck supple  Cardiac exam: Tachycardic; I did not appreciate a murmur.  Pulmonary exam: unlabored and clear  Abd Exam: soft nontender   Musculoskeletal: no joint tenderness, deformity or swelling;  Neurologic: GCS 15; moving all extremities equally, no facial droop   Psychologic: not cooperative with exam.   Skin: self inflicted superficial abrasion to face and bilateral legs    Medical decision making:     Katelynn Crabtree is a 34 y.o. female, with a PMHx of ADHD, bipolar disorder, depression, nicolás, schizoaffective disorder, who presents to the ED for psychiatric evaluation.  See ED course for plan    Nurses notes and Vital Signs reviewed.     Problems: Today's visit reveals acute psychosis which is a/an Acute on Chronic problem that is concerning for deterioration due to a differential diagnosis that includes toxidrome, electrolyte imbalance, gravely disabled.     Other problems today include superficial abrasions to the face and extremities     MDM Components integrated into this visit: Social determinants of health impacting care today: Mental health problems not at goal, IV or IM opiates or benzodiazepines    Considerations: My decision to transfer for higher level of care this patient is based on acute psychosis.          See ER course below for lab test ordered, results reviewed, independent interpretation of images or EKG, discussion with consultants, data obtained from sources other than patient:  ED Course as of 12/13/24 1556   Mon Dec 09, 2024   1500 Blood pressure elevated.  Tachypnea and tachycardia present.  Patient is quite  agitated and on redirectable therefore I have ordered sedating agents [MH]   1511 Chart review reveals the patient has a history of bipolar disorder. [MH]   1533 CBC is normal [MH]   1624 HCG is negative [MH]   1626 UA is not consistent with a UTI [MH]   1626 CMP reveals a potassium of 3.3.  Bicarb of 20.  Creatinine is normal at baseline [MH]   1627 TSH is normal [MH]   1748 Patient is medically clear for psychiatric disposition.  I will consult the [facility] for transfer as the patient is gravely disabled and psychotic [MH]   1752 Attempted to reach collaterals:  Elise Lindo (Relative)  [phone]: No answer [MH]   2226 Notified by nursing staff that patient has increasing agitation/anxiety.  Per review of records patient was given Haldol Ativan earlier in her ED course.  Will give an additional 2 mg of Ativan [AS]      ED Course User Index  [AS] Junior Grover MD  [MH] Rell Contreras MD          CRITICAL CARE TIME:   Critical care services included the following: chart data review, reviewing nursing notes and researching old charts from internal and external sources, documentation time, consultant collaboration regarding findings and treatment options, medication orders and management, direct patient care, vital sign assessments, physical exam reassessments, and ordering, interpreting and reviewing diagnostic studies/lab tests.    Aggregate critical care time was approximately 35 minutes, which includes only time during which I was engaged in work directly related to the patient's care, as described above, whether at the bedside or elsewhere in the Emergency Department.  It did not include time spent performing other reported procedures or the services of residents, students, nurses or physician assistants.      Transfer of care:  [facility] consulted      Orders Placed This Encounter   Procedures    CBC auto differential    Comprehensive metabolic panel    TSH    Urinalysis, Reflex to Urine Culture Urine, Clean  Catch    Drug screen panel, emergency    Ethanol    Acetaminophen level    hCG, quantitative, pregnancy    Urinalysis Microscopic    Undress patient and allow them to wear facility provided apparel.    EKG 12-lead    PFC Facilitated Request from Candice Carreon, and [facility]    PEC/Psych Hold - Physicians Emergency Certificate / 72 Hour Psych Hold     Medications   LORazepam injection 2 mg (2 mg Intramuscular Given 12/9/24 1505)   haloperidol lactate injection 5 mg (5 mg Intramuscular Given 12/9/24 1505)   LORazepam injection 2 mg (2 mg Intramuscular Given 12/9/24 2230)           Diagnoses that have been ruled out:   None   Diagnoses that are still under consideration:   None   Final diagnoses:   Medical clearance for psychiatric admission   Acute psychosis          Disposition:  Transfer for psychiatric admission          Rell Contreras      This note was created using [employer].  This program may occasionally misinterpret certain words and phrases.      SCRIBE ATTESTATION NOTE:   I attest that I personally performed the services documented by the scribe and acknowledged and confirm the content of the note.   Nurses notes were reviewed.  Rell East MD  12/09/24 8009       Rell Contreras MD  12/13/24 3097

## 2024-12-10 ENCOUNTER — HOSPITAL ENCOUNTER (INPATIENT)
Facility: HOSPITAL | Age: 34
LOS: 8 days | Discharge: HOME | DRG: 885 | End: 2024-12-18
Attending: PSYCHIATRY & NEUROLOGY | Admitting: PSYCHIATRY & NEUROLOGY
Payer: MEDICAID

## 2024-12-10 DIAGNOSIS — F29 PSYCHOSIS: ICD-10-CM

## 2024-12-10 DIAGNOSIS — F29 PSYCHOSIS, UNSPECIFIED PSYCHOSIS TYPE: Primary | ICD-10-CM

## 2024-12-10 PROBLEM — E87.6 HYPOKALEMIA: Status: ACTIVE | Noted: 2024-12-10

## 2024-12-10 PROBLEM — J30.9 ALLERGIC RHINITIS: Status: ACTIVE | Noted: 2024-12-10

## 2024-12-10 LAB
OHS QRS DURATION: 74 MS
OHS QTC CALCULATION: 466 MS

## 2024-12-10 PROCEDURE — 99223 1ST HOSP IP/OBS HIGH 75: CPT | Mod: AF,HB,, | Performed by: PSYCHIATRY & NEUROLOGY

## 2024-12-10 PROCEDURE — 25000003 PHARM REV CODE 250: Performed by: INTERNAL MEDICINE

## 2024-12-10 PROCEDURE — 25000003 PHARM REV CODE 250: Performed by: PSYCHIATRY & NEUROLOGY

## 2024-12-10 PROCEDURE — 11400000 HC PSYCH PRIVATE ROOM

## 2024-12-10 PROCEDURE — 63600175 PHARM REV CODE 636 W HCPCS: Performed by: INTERNAL MEDICINE

## 2024-12-10 PROCEDURE — 90833 PSYTX W PT W E/M 30 MIN: CPT | Mod: AF,HB,, | Performed by: PSYCHIATRY & NEUROLOGY

## 2024-12-10 RX ORDER — ACETAMINOPHEN 325 MG/1
650 TABLET ORAL EVERY 6 HOURS PRN
Status: DISCONTINUED | OUTPATIENT
Start: 2024-12-10 | End: 2024-12-18 | Stop reason: HOSPADM

## 2024-12-10 RX ORDER — OLANZAPINE 10 MG/1
10 TABLET ORAL EVERY 8 HOURS PRN
Status: DISCONTINUED | OUTPATIENT
Start: 2024-12-10 | End: 2024-12-10

## 2024-12-10 RX ORDER — LOPERAMIDE HYDROCHLORIDE 2 MG/1
2 CAPSULE ORAL
Status: DISCONTINUED | OUTPATIENT
Start: 2024-12-10 | End: 2024-12-18 | Stop reason: HOSPADM

## 2024-12-10 RX ORDER — ONDANSETRON 4 MG/1
4 TABLET, ORALLY DISINTEGRATING ORAL EVERY 8 HOURS PRN
Status: DISCONTINUED | OUTPATIENT
Start: 2024-12-10 | End: 2024-12-18 | Stop reason: HOSPADM

## 2024-12-10 RX ORDER — LORAZEPAM 1 MG/1
2 TABLET ORAL EVERY 6 HOURS PRN
Status: DISCONTINUED | OUTPATIENT
Start: 2024-12-10 | End: 2024-12-18 | Stop reason: HOSPADM

## 2024-12-10 RX ORDER — ALUMINUM HYDROXIDE, MAGNESIUM HYDROXIDE, AND SIMETHICONE 1200; 120; 1200 MG/30ML; MG/30ML; MG/30ML
30 SUSPENSION ORAL EVERY 6 HOURS PRN
Status: DISCONTINUED | OUTPATIENT
Start: 2024-12-10 | End: 2024-12-18 | Stop reason: HOSPADM

## 2024-12-10 RX ORDER — DIPHENHYDRAMINE HCL 25 MG
50 CAPSULE ORAL EVERY 6 HOURS PRN
Status: DISCONTINUED | OUTPATIENT
Start: 2024-12-10 | End: 2024-12-18 | Stop reason: HOSPADM

## 2024-12-10 RX ORDER — OLANZAPINE 10 MG/2ML
10 INJECTION, POWDER, FOR SOLUTION INTRAMUSCULAR EVERY 8 HOURS PRN
Status: DISCONTINUED | OUTPATIENT
Start: 2024-12-10 | End: 2024-12-10

## 2024-12-10 RX ORDER — DIPHENHYDRAMINE HYDROCHLORIDE 50 MG/ML
50 INJECTION INTRAMUSCULAR; INTRAVENOUS EVERY 6 HOURS PRN
Status: DISCONTINUED | OUTPATIENT
Start: 2024-12-10 | End: 2024-12-18 | Stop reason: HOSPADM

## 2024-12-10 RX ORDER — LORAZEPAM 2 MG/ML
2 INJECTION INTRAMUSCULAR EVERY 6 HOURS PRN
Status: DISCONTINUED | OUTPATIENT
Start: 2024-12-10 | End: 2024-12-18 | Stop reason: HOSPADM

## 2024-12-10 RX ORDER — CETIRIZINE HYDROCHLORIDE 5 MG/1
10 TABLET ORAL DAILY
Status: DISCONTINUED | OUTPATIENT
Start: 2024-12-10 | End: 2024-12-18 | Stop reason: HOSPADM

## 2024-12-10 RX ORDER — PROMETHAZINE HYDROCHLORIDE 25 MG/1
25 TABLET ORAL EVERY 6 HOURS PRN
Status: DISCONTINUED | OUTPATIENT
Start: 2024-12-10 | End: 2024-12-18 | Stop reason: HOSPADM

## 2024-12-10 RX ORDER — BUPRENORPHINE AND NALOXONE 8; 2 MG/1; MG/1
1 FILM, SOLUBLE BUCCAL; SUBLINGUAL 3 TIMES DAILY
Status: DISCONTINUED | OUTPATIENT
Start: 2024-12-10 | End: 2024-12-12

## 2024-12-10 RX ORDER — TALC
6 POWDER (GRAM) TOPICAL NIGHTLY PRN
Status: DISCONTINUED | OUTPATIENT
Start: 2024-12-10 | End: 2024-12-18 | Stop reason: HOSPADM

## 2024-12-10 RX ORDER — BENZTROPINE MESYLATE 1 MG/ML
2 INJECTION, SOLUTION INTRAMUSCULAR; INTRAVENOUS EVERY 8 HOURS PRN
Status: DISCONTINUED | OUTPATIENT
Start: 2024-12-10 | End: 2024-12-18 | Stop reason: HOSPADM

## 2024-12-10 RX ORDER — TRAZODONE HYDROCHLORIDE 50 MG/1
50 TABLET ORAL NIGHTLY
Status: DISCONTINUED | OUTPATIENT
Start: 2024-12-10 | End: 2024-12-18 | Stop reason: HOSPADM

## 2024-12-10 RX ORDER — HALOPERIDOL 5 MG/1
5 TABLET ORAL EVERY 6 HOURS PRN
Status: DISCONTINUED | OUTPATIENT
Start: 2024-12-10 | End: 2024-12-18 | Stop reason: HOSPADM

## 2024-12-10 RX ORDER — CLONAZEPAM 1 MG/1
1 TABLET ORAL 3 TIMES DAILY
Status: DISCONTINUED | OUTPATIENT
Start: 2024-12-10 | End: 2024-12-18 | Stop reason: HOSPADM

## 2024-12-10 RX ORDER — GABAPENTIN 400 MG/1
800 CAPSULE ORAL 3 TIMES DAILY
Status: DISCONTINUED | OUTPATIENT
Start: 2024-12-10 | End: 2024-12-12

## 2024-12-10 RX ORDER — PROPRANOLOL HYDROCHLORIDE 20 MG/1
20 TABLET ORAL 2 TIMES DAILY
Status: DISCONTINUED | OUTPATIENT
Start: 2024-12-10 | End: 2024-12-18 | Stop reason: HOSPADM

## 2024-12-10 RX ORDER — RISPERIDONE 0.5 MG/1
0.5 TABLET ORAL 2 TIMES DAILY
Status: DISCONTINUED | OUTPATIENT
Start: 2024-12-10 | End: 2024-12-11

## 2024-12-10 RX ORDER — BENZONATATE 100 MG/1
100 CAPSULE ORAL 3 TIMES DAILY PRN
Status: DISCONTINUED | OUTPATIENT
Start: 2024-12-10 | End: 2024-12-18 | Stop reason: HOSPADM

## 2024-12-10 RX ORDER — IBUPROFEN 200 MG
1 TABLET ORAL DAILY PRN
Status: DISCONTINUED | OUTPATIENT
Start: 2024-12-10 | End: 2024-12-18 | Stop reason: HOSPADM

## 2024-12-10 RX ORDER — HALOPERIDOL 5 MG/ML
5 INJECTION INTRAMUSCULAR EVERY 6 HOURS PRN
Status: DISCONTINUED | OUTPATIENT
Start: 2024-12-10 | End: 2024-12-18 | Stop reason: HOSPADM

## 2024-12-10 RX ADMIN — BUPRENORPHINE AND NALOXONE 1 FILM: 8; 2 FILM BUCCAL; SUBLINGUAL at 02:12

## 2024-12-10 RX ADMIN — DIPHENHYDRAMINE HYDROCHLORIDE 50 MG: 25 CAPSULE ORAL at 02:12

## 2024-12-10 RX ADMIN — CLONAZEPAM 1 MG: 1 TABLET ORAL at 02:12

## 2024-12-10 RX ADMIN — OLANZAPINE 10 MG: 10 TABLET, FILM COATED ORAL at 12:12

## 2024-12-10 RX ADMIN — CETIRIZINE HYDROCHLORIDE 10 MG: 5 TABLET ORAL at 09:12

## 2024-12-10 RX ADMIN — LORAZEPAM 2 MG: 1 TABLET ORAL at 02:12

## 2024-12-10 RX ADMIN — CLONAZEPAM 1 MG: 1 TABLET ORAL at 08:12

## 2024-12-10 RX ADMIN — RISPERIDONE 0.5 MG: 0.5 TABLET, FILM COATED ORAL at 09:12

## 2024-12-10 RX ADMIN — PROPRANOLOL HYDROCHLORIDE 20 MG: 20 TABLET ORAL at 08:12

## 2024-12-10 RX ADMIN — GABAPENTIN 800 MG: 400 CAPSULE ORAL at 08:12

## 2024-12-10 RX ADMIN — TRAZODONE HYDROCHLORIDE 50 MG: 50 TABLET ORAL at 08:12

## 2024-12-10 RX ADMIN — HALOPERIDOL 5 MG: 5 TABLET ORAL at 02:12

## 2024-12-10 RX ADMIN — PROPRANOLOL HYDROCHLORIDE 20 MG: 20 TABLET ORAL at 09:12

## 2024-12-10 RX ADMIN — RISPERIDONE 0.5 MG: 0.5 TABLET, FILM COATED ORAL at 08:12

## 2024-12-10 RX ADMIN — GABAPENTIN 800 MG: 400 CAPSULE ORAL at 02:12

## 2024-12-10 RX ADMIN — ACETAMINOPHEN 650 MG: 325 TABLET ORAL at 12:12

## 2024-12-10 RX ADMIN — OLANZAPINE 10 MG: 10 TABLET, FILM COATED ORAL at 09:12

## 2024-12-10 NOTE — PLAN OF CARE
Problem: Adult Behavioral Health Plan of Care  Goal: Plan of Care Review  Outcome: Progressing  Goal: Patient-Specific Goal (Individualization)  Outcome: Progressing  Goal: Adheres to Safety Considerations for Self and Others  Outcome: Progressing

## 2024-12-10 NOTE — HPI
"Psychiatric Evaluation        Pt arrived via ems, pt chief complaint is a Psychiatric evaluation. Pt screaming triage saying she is going to die, pt displaying manic behavior with scratches all over body.            Source of HPI & ROS: patient and EMS personnel     HPI: Katelynn Crabtree is a 34 y.o. female, with a PMHx of ADHD, bipolar disorder, depression, nicolás, schizoaffective disorder, who presents to the ED for psychiatric evaluation. Independent historian: EMS reports they were called to the scene by patient's  for patient exhibiting dyspnea. When they arrived the patient was outside the building in current state stating "I do not want to die". EMS reports she walked into ambulance without assistance. Patient has visible self inflicted abrasions to skin. No other exacerbating or alleviating factors. Patient denies illicit drug usage. There are no other complaints at this time. Patient reports known allergy to hydroxyzine.     12/10/2024: patient seen and examined this morning with no medical complaints. Will continue inpatient psych admission for further evaluation and medication management.       " [Follow-Up: _____] : a [unfilled] follow-up visit

## 2024-12-10 NOTE — NURSING
Patient c/o headache, received prn medication as ordered, see emar for dosages. No adverse reaction noted. Patient states medication ineffective.

## 2024-12-10 NOTE — ASSESSMENT & PLAN NOTE
Patient's most recent potassium results are listed below.   Recent Labs     12/09/24  1510   K 3.3*     Plan  - Replete potassium per protocol  - will recheck cmp

## 2024-12-10 NOTE — NURSING
Lab at bedside for lab draw.  Pt initially agreed, but was paranoid and agitated after unsuccessful stick x 1.

## 2024-12-10 NOTE — ED NOTES
Security at bedside with patient belongings to escort pt out. Valuables given to [facility] personnel. Pt extremely anxious once upon stretcher. Crying and screaming about staff trying to kill her with hepatitis c. Provider made aware. Awaiting MD orders.

## 2024-12-10 NOTE — NURSING
Patient heard yelling at nurses station from her room. Patient confused, rambling. Patient received prn medication as ordered, see emar. No adverse reaction noted, patient currently continues to walk around room, talking to herself.

## 2024-12-10 NOTE — NURSING
"34 year old female w/a PmHx ADHD, Bipolar, depression, nicolás, schizoaffective disorder admitted from [facility] for the services of Dr Looney for paranoid delusions.      Pt arrived tearful and paranoid but was cooperative with admission.  Pt is anxious and fearful, because she believes that people are trying to kill her, her son and 'everyone'.  Pt denies smoking, alcohol use and illegal drug use.  UDS was negative.  During admission interview pt was labile and hyper verbal and very difficult to redirect to questions.  Pt kept repeating that she believes people are trying to kill her.  Pt denies SI/HI/AVH, but appeared RIS during interview.    Pt arrived at the ED via ambulance.  EMS reported that they were called by patient's .  Upon arrival, pt was frantic stating, "I do not want to die".  Pt had self-inflicted scratches to her face.    "

## 2024-12-10 NOTE — ED NOTES
Report received from JOÃO Rock. Care taken over. Pt resting comfortably on mattress, sitter at bedside, and NADN.

## 2024-12-10 NOTE — CONSULTS
RD consulted for new admit. Spoke with RN via phone and RN stated that the pt has no dietary issues at this time. Pt is tolerating a Regular diet and consuming an adequate amount of meals and snacks. Nutrition services are not warranted at this time. RD to sign off. Please re-consult if any dietary/nutrition issues arise.

## 2024-12-10 NOTE — PLAN OF CARE
SW attempted to complete initial PSA interview, but pt clinically inappropriate. Pt noted with delusions and disorganized thoughts. Pt also tearful off and on during interview. SW will re-attempt at a later time.

## 2024-12-10 NOTE — H&P
"  [facility] Hospital Medicine  History & Physical    Patient Name: Katelynn Crabtree  MRN: [id number]  Patient Class: IP- Psych  Admission Date: 12/10/2024  Attending Physician: Brandyn Looney MD   Primary Care Provider: Tonya Reeves MD         Patient information was obtained from ER records.     Subjective:     Principal Problem:Psychosis    Chief Complaint: No chief complaint on file.       HPI:   Psychiatric Evaluation        Pt arrived via ems, pt chief complaint is a Psychiatric evaluation. Pt screaming triage saying she is going to die, pt displaying manic behavior with scratches all over body.            Source of HPI & ROS: patient and EMS personnel     HPI: Katelynn Crabtree is a 34 y.o. female, with a PMHx of ADHD, bipolar disorder, depression, nicolás, schizoaffective disorder, who presents to the ED for psychiatric evaluation. Independent historian: EMS reports they were called to the scene by patient's  for patient exhibiting dyspnea. When they arrived the patient was outside the building in current state stating "I do not want to die". EMS reports she walked into ambulance without assistance. Patient has visible self inflicted abrasions to skin. No other exacerbating or alleviating factors. Patient denies illicit drug usage. There are no other complaints at this time. Patient reports known allergy to hydroxyzine.     12/10/2024: patient seen and examined this morning with no medical complaints. Will continue inpatient psych admission for further evaluation and medication management.         Past Medical History:   Diagnosis Date    Attention deficit hyperactivity disorder (ADHD) 7/4/2023    Bipolar disorder     Depression     History of psychiatric hospitalization     Hx of psychiatric care     Nicolás     Psychiatric exam requested by authority     Psychiatric problem     Psychosis     Schizoaffective disorder     Therapy        Past Surgical History: "   Procedure Laterality Date     SECTION         Review of patient's allergies indicates:   Allergen Reactions    Hydroxyzine Swelling     Pt says her throat swells up       Current Facility-Administered Medications on File Prior to Encounter   Medication    [COMPLETED] haloperidol lactate injection 5 mg    [COMPLETED] LORazepam injection 2 mg    [COMPLETED] LORazepam injection 2 mg    [DISCONTINUED] droPERidol injection 2 mg     Current Outpatient Medications on File Prior to Encounter   Medication Sig    benztropine (COGENTIN) 1 MG tablet Take 1 tablet (1 mg total) by mouth every evening.    cetirizine (ZYRTEC) 10 MG tablet Take 1 tablet (10 mg total) by mouth once daily.    clonazePAM (KLONOPIN) 1 MG tablet Take 1 tablet (1 mg total) by mouth 2 (two) times daily.    diclofenac sodium (VOLTAREN) 1 % Gel Apply 2 g topically 4 (four) times daily.    gabapentin (NEURONTIN) 600 MG tablet Take 1 tablet (600 mg total) by mouth 3 (three) times daily.    ibuprofen (ADVIL,MOTRIN) 600 MG tablet Take 1 tablet (600 mg total) by mouth every 6 (six) hours as needed.    lisdexamfetamine (VYVANSE) 40 MG Cap Take 1 capsule (40 mg total) by mouth once daily.    paliperidone palmitate (INVEGA SUSTENNA) 156 mg/mL Syrg injection Inject 156 mg into the muscle every 28 days.    propranoloL (INDERAL) 10 MG tablet Take 1 tablet (10 mg total) by mouth 2 (two) times daily.    traZODone (DESYREL) 50 MG tablet Take 1 tablet (50 mg total) by mouth every evening.    triamcinolone acetonide 0.1% (KENALOG) 0.1 % cream Apply topically 2 (two) times daily. for 14 days    [DISCONTINUED] citalopram (CELEXA) 20 MG tablet Take 20 mg by mouth once daily.    [DISCONTINUED] divalproex (DEPAKOTE) 250 MG EC tablet Take 1 tablet (250 mg total) by mouth once daily.     Family History       Problem Relation (Age of Onset)    Alcohol abuse Mother, Maternal Aunt    Bipolar disorder Mother          Tobacco Use    Smoking status: Former     Current  packs/day: 1.00     Average packs/day: 1 pack/day for 26.2 years (26.2 ttl pk-yrs)     Types: Cigarettes     Start date: 9/12/1998    Smokeless tobacco: Never    Tobacco comments:     on the hazards of smoking   Substance and Sexual Activity    Alcohol use: No     Comment: hx of drinking daquiris; sober 15 months    Drug use: Not Currently     Types: Methamphetamines, Heroin     Comment: Crystal Meth; sober 15 months    Sexual activity: Yes     Partners: Male     Review of Systems   Constitutional:  Negative for fatigue and fever.   HENT:  Negative for congestion, ear pain and sore throat.    Eyes:  Negative for pain and discharge.   Respiratory:  Negative for cough, shortness of breath and wheezing.    Gastrointestinal:  Negative for abdominal pain, constipation, diarrhea, nausea and vomiting.   Endocrine: Negative for cold intolerance and heat intolerance.   Genitourinary:  Negative for difficulty urinating, dysuria and frequency.   Musculoskeletal:  Negative for arthralgias.   Allergic/Immunologic: Negative for environmental allergies.   Neurological:  Negative for dizziness, tremors and seizures.   Psychiatric/Behavioral:  Positive for behavioral problems and hallucinations. The patient is nervous/anxious and is hyperactive.    All other systems reviewed and are negative.    Objective:     Vital Signs (Most Recent):  Temp: 98.2 °F (36.8 °C) (12/10/24 0742)  Pulse: 81 (12/10/24 0742)  Resp: 18 (12/10/24 0742)  BP: 123/67 (12/10/24 0742)  SpO2: 96 % (12/10/24 0742) Vital Signs (24h Range):  Temp:  [97.3 °F (36.3 °C)-98.2 °F (36.8 °C)] 98.2 °F (36.8 °C)  Pulse:  [] 81  Resp:  [18-24] 18  SpO2:  [96 %-100 %] 96 %  BP: (123-149)/(67-90) 123/67     Weight: 69.9 kg (154 lb)  Body mass index is 27.28 kg/m².     Physical Exam  Vitals and nursing note reviewed.   Constitutional:       Appearance: Normal appearance.   HENT:      Head: Normocephalic and atraumatic.      Nose: Nose normal.      Mouth/Throat:       Mouth: Mucous membranes are moist.      Pharynx: Oropharynx is clear.   Eyes:      Extraocular Movements: Extraocular movements intact.      Conjunctiva/sclera: Conjunctivae normal.      Pupils: Pupils are equal, round, and reactive to light.   Cardiovascular:      Rate and Rhythm: Normal rate and regular rhythm.      Pulses: Normal pulses.      Heart sounds: Normal heart sounds.   Pulmonary:      Effort: Pulmonary effort is normal.      Breath sounds: Normal breath sounds.   Abdominal:      General: Abdomen is flat. Bowel sounds are normal.      Palpations: Abdomen is soft.   Musculoskeletal:         General: Normal range of motion.      Cervical back: Normal range of motion and neck supple.   Skin:     General: Skin is warm and dry.      Capillary Refill: Capillary refill takes less than 2 seconds.      Comments: No rashes on limited skin exam.   Neurological:      General: No focal deficit present.      Mental Status: She is alert and oriented to person, place, and time.      Cranial Nerves: No cranial nerve deficit.      Comments: I Olfactory:  Sense of smell intact    II Optic:  Pupils equal round react to light.  Vision intact.    III, IV, VI, Ocular motor, Trochlear, Abducens:  Extraocular movements intact    V Trigeminal:  Facial sensation intact facial sensation intact,, muscles of mastication intact muscles of mastication intact, corneal reflex intact, corneal reflex intact    VII Facial:  Muscles of facial expression intact     VIII Vestibular cochlear: Hearing intact vestibular cochlear: Hearing intact    IX Glossopharyngeal:  Gag reflex intact.  Tasting intact.     X Vagus:  Gag reflex intact.    XI Spinal Accessory:  Shoulder shrug intact.  Head rotation intact.    XII Hypoglossal:  Tongue movements intact.                CRANIAL NERVES     CN III, IV, VI   Pupils are equal, round, and reactive to light.       Significant Labs: All pertinent labs within the past 24 hours have been  reviewed.    Significant Imaging: I have reviewed all pertinent imaging results/findings within the past 24 hours.  Assessment/Plan:     * Psychosis  To be admitted to our inpatient psychiatric unit for further evaluation and management.        Allergic rhinitis  Cetirizine daily       Hypokalemia  Patient's most recent potassium results are listed below.   Recent Labs     12/09/24  1510   K 3.3*     Plan  - Replete potassium per protocol  - will recheck cmp    Opioid use disorder, severe, in early remission  Continue suboxone       Attention deficit hyperactivity disorder (ADHD)  To be admitted to our inpatient psychiatric unit for further evaluation and management.      Bipolar 1 disorder, mixed, mild  To be admitted to our inpatient psychiatric unit for further evaluation and management.          VTE Risk Mitigation (From admission, onward)      None                            Hoang Kelly Jr, MD  Department of Hospital Medicine  [facility] ([facility])
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
## 2024-12-10 NOTE — SUBJECTIVE & OBJECTIVE
Past Medical History:   Diagnosis Date    Attention deficit hyperactivity disorder (ADHD) 2023    Bipolar disorder     Depression     History of psychiatric hospitalization     Hx of psychiatric care     Marsha     Psychiatric exam requested by authority     Psychiatric problem     Psychosis     Schizoaffective disorder     Therapy        Past Surgical History:   Procedure Laterality Date     SECTION         Review of patient's allergies indicates:   Allergen Reactions    Hydroxyzine Swelling     Pt says her throat swells up       Current Facility-Administered Medications on File Prior to Encounter   Medication    [COMPLETED] haloperidol lactate injection 5 mg    [COMPLETED] LORazepam injection 2 mg    [COMPLETED] LORazepam injection 2 mg    [DISCONTINUED] droPERidol injection 2 mg     Current Outpatient Medications on File Prior to Encounter   Medication Sig    benztropine (COGENTIN) 1 MG tablet Take 1 tablet (1 mg total) by mouth every evening.    cetirizine (ZYRTEC) 10 MG tablet Take 1 tablet (10 mg total) by mouth once daily.    clonazePAM (KLONOPIN) 1 MG tablet Take 1 tablet (1 mg total) by mouth 2 (two) times daily.    diclofenac sodium (VOLTAREN) 1 % Gel Apply 2 g topically 4 (four) times daily.    gabapentin (NEURONTIN) 600 MG tablet Take 1 tablet (600 mg total) by mouth 3 (three) times daily.    ibuprofen (ADVIL,MOTRIN) 600 MG tablet Take 1 tablet (600 mg total) by mouth every 6 (six) hours as needed.    lisdexamfetamine (VYVANSE) 40 MG Cap Take 1 capsule (40 mg total) by mouth once daily.    paliperidone palmitate (INVEGA SUSTENNA) 156 mg/mL Syrg injection Inject 156 mg into the muscle every 28 days.    propranoloL (INDERAL) 10 MG tablet Take 1 tablet (10 mg total) by mouth 2 (two) times daily.    traZODone (DESYREL) 50 MG tablet Take 1 tablet (50 mg total) by mouth every evening.    triamcinolone acetonide 0.1% (KENALOG) 0.1 % cream Apply topically 2 (two) times daily. for 14 days     [DISCONTINUED] citalopram (CELEXA) 20 MG tablet Take 20 mg by mouth once daily.    [DISCONTINUED] divalproex (DEPAKOTE) 250 MG EC tablet Take 1 tablet (250 mg total) by mouth once daily.     Family History       Problem Relation (Age of Onset)    Alcohol abuse Mother, Maternal Aunt    Bipolar disorder Mother          Tobacco Use    Smoking status: Former     Current packs/day: 1.00     Average packs/day: 1 pack/day for 26.2 years (26.2 ttl pk-yrs)     Types: Cigarettes     Start date: 9/12/1998    Smokeless tobacco: Never    Tobacco comments:     on the hazards of smoking   Substance and Sexual Activity    Alcohol use: No     Comment: hx of drinking daquiris; sober 15 months    Drug use: Not Currently     Types: Methamphetamines, Heroin     Comment: Crystal Meth; sober 15 months    Sexual activity: Yes     Partners: Male     Review of Systems   Constitutional:  Negative for fatigue and fever.   HENT:  Negative for congestion, ear pain and sore throat.    Eyes:  Negative for pain and discharge.   Respiratory:  Negative for cough, shortness of breath and wheezing.    Gastrointestinal:  Negative for abdominal pain, constipation, diarrhea, nausea and vomiting.   Endocrine: Negative for cold intolerance and heat intolerance.   Genitourinary:  Negative for difficulty urinating, dysuria and frequency.   Musculoskeletal:  Negative for arthralgias.   Allergic/Immunologic: Negative for environmental allergies.   Neurological:  Negative for dizziness, tremors and seizures.   Psychiatric/Behavioral:  Positive for behavioral problems and hallucinations. The patient is nervous/anxious and is hyperactive.    All other systems reviewed and are negative.    Objective:     Vital Signs (Most Recent):  Temp: 98.2 °F (36.8 °C) (12/10/24 0742)  Pulse: 81 (12/10/24 0742)  Resp: 18 (12/10/24 0742)  BP: 123/67 (12/10/24 0742)  SpO2: 96 % (12/10/24 0742) Vital Signs (24h Range):  Temp:  [97.3 °F (36.3 °C)-98.2 °F (36.8 °C)] 98.2 °F (36.8  °C)  Pulse:  [] 81  Resp:  [18-24] 18  SpO2:  [96 %-100 %] 96 %  BP: (123-149)/(67-90) 123/67     Weight: 69.9 kg (154 lb)  Body mass index is 27.28 kg/m².     Physical Exam  Vitals and nursing note reviewed.   Constitutional:       Appearance: Normal appearance.   HENT:      Head: Normocephalic and atraumatic.      Nose: Nose normal.      Mouth/Throat:      Mouth: Mucous membranes are moist.      Pharynx: Oropharynx is clear.   Eyes:      Extraocular Movements: Extraocular movements intact.      Conjunctiva/sclera: Conjunctivae normal.      Pupils: Pupils are equal, round, and reactive to light.   Cardiovascular:      Rate and Rhythm: Normal rate and regular rhythm.      Pulses: Normal pulses.      Heart sounds: Normal heart sounds.   Pulmonary:      Effort: Pulmonary effort is normal.      Breath sounds: Normal breath sounds.   Abdominal:      General: Abdomen is flat. Bowel sounds are normal.      Palpations: Abdomen is soft.   Musculoskeletal:         General: Normal range of motion.      Cervical back: Normal range of motion and neck supple.   Skin:     General: Skin is warm and dry.      Capillary Refill: Capillary refill takes less than 2 seconds.      Comments: No rashes on limited skin exam.   Neurological:      General: No focal deficit present.      Mental Status: She is alert and oriented to person, place, and time.      Cranial Nerves: No cranial nerve deficit.      Comments: I Olfactory:  Sense of smell intact    II Optic:  Pupils equal round react to light.  Vision intact.    III, IV, VI, Ocular motor, Trochlear, Abducens:  Extraocular movements intact    V Trigeminal:  Facial sensation intact facial sensation intact,, muscles of mastication intact muscles of mastication intact, corneal reflex intact, corneal reflex intact    VII Facial:  Muscles of facial expression intact     VIII Vestibular cochlear: Hearing intact vestibular cochlear: Hearing intact    IX Glossopharyngeal:  Gag reflex  intact.  Tasting intact.     X Vagus:  Gag reflex intact.    XI Spinal Accessory:  Shoulder shrug intact.  Head rotation intact.    XII Hypoglossal:  Tongue movements intact.                CRANIAL NERVES     CN III, IV, VI   Pupils are equal, round, and reactive to light.       Significant Labs: All pertinent labs within the past 24 hours have been reviewed.    Significant Imaging: I have reviewed all pertinent imaging results/findings within the past 24 hours.

## 2024-12-11 LAB
ALBUMIN SERPL BCP-MCNC: 3.3 G/DL (ref 3.5–5.2)
ALP SERPL-CCNC: 68 U/L (ref 55–135)
ALT SERPL W/O P-5'-P-CCNC: 29 U/L (ref 10–44)
ANION GAP SERPL CALC-SCNC: 7 MMOL/L (ref 3–11)
AST SERPL-CCNC: 33 U/L (ref 10–40)
BILIRUB SERPL-MCNC: 0.3 MG/DL (ref 0.1–1)
BUN SERPL-MCNC: 11 MG/DL (ref 6–20)
CALCIUM SERPL-MCNC: 9.1 MG/DL (ref 8.7–10.5)
CHLORIDE SERPL-SCNC: 106 MMOL/L (ref 95–110)
CHOLEST SERPL-MCNC: 133 MG/DL (ref 120–199)
CHOLEST/HDLC SERPL: 2.6 {RATIO} (ref 2–5)
CO2 SERPL-SCNC: 28 MMOL/L (ref 23–29)
CREAT SERPL-MCNC: 1 MG/DL (ref 0.5–1.4)
EST. GFR  (NO RACE VARIABLE): >60 ML/MIN/1.73 M^2
ESTIMATED AVG GLUCOSE: 103 MG/DL (ref 68–131)
GLUCOSE SERPL-MCNC: 90 MG/DL (ref 70–110)
HBA1C MFR BLD: 5.2 % (ref 4–5.6)
HDLC SERPL-MCNC: 51 MG/DL (ref 40–75)
HDLC SERPL: 38.3 % (ref 20–50)
LDLC SERPL CALC-MCNC: 65.8 MG/DL (ref 63–159)
NONHDLC SERPL-MCNC: 82 MG/DL
POTASSIUM SERPL-SCNC: 4.3 MMOL/L (ref 3.5–5.1)
PROT SERPL-MCNC: 6.7 G/DL (ref 6–8.4)
SODIUM SERPL-SCNC: 141 MMOL/L (ref 136–145)
TRIGL SERPL-MCNC: 81 MG/DL (ref 30–150)

## 2024-12-11 PROCEDURE — 90833 PSYTX W PT W E/M 30 MIN: CPT | Mod: AF,HB,, | Performed by: PSYCHIATRY & NEUROLOGY

## 2024-12-11 PROCEDURE — 80061 LIPID PANEL: CPT | Performed by: PSYCHIATRY & NEUROLOGY

## 2024-12-11 PROCEDURE — 80053 COMPREHEN METABOLIC PANEL: CPT | Performed by: INTERNAL MEDICINE

## 2024-12-11 PROCEDURE — 25000003 PHARM REV CODE 250: Performed by: INTERNAL MEDICINE

## 2024-12-11 PROCEDURE — 11400000 HC PSYCH PRIVATE ROOM

## 2024-12-11 PROCEDURE — 63600175 PHARM REV CODE 636 W HCPCS: Performed by: INTERNAL MEDICINE

## 2024-12-11 PROCEDURE — 25000003 PHARM REV CODE 250: Performed by: PSYCHIATRY & NEUROLOGY

## 2024-12-11 PROCEDURE — 99233 SBSQ HOSP IP/OBS HIGH 50: CPT | Mod: AF,HB,, | Performed by: PSYCHIATRY & NEUROLOGY

## 2024-12-11 PROCEDURE — 83036 HEMOGLOBIN GLYCOSYLATED A1C: CPT | Performed by: PSYCHIATRY & NEUROLOGY

## 2024-12-11 RX ORDER — RISPERIDONE 1 MG/1
1 TABLET ORAL 2 TIMES DAILY
Status: DISCONTINUED | OUTPATIENT
Start: 2024-12-11 | End: 2024-12-13

## 2024-12-11 RX ADMIN — CLONAZEPAM 1 MG: 1 TABLET ORAL at 08:12

## 2024-12-11 RX ADMIN — PROPRANOLOL HYDROCHLORIDE 20 MG: 20 TABLET ORAL at 08:12

## 2024-12-11 RX ADMIN — RISPERIDONE 0.5 MG: 0.5 TABLET, FILM COATED ORAL at 09:12

## 2024-12-11 RX ADMIN — CLONAZEPAM 1 MG: 1 TABLET ORAL at 02:12

## 2024-12-11 RX ADMIN — PROPRANOLOL HYDROCHLORIDE 20 MG: 20 TABLET ORAL at 09:12

## 2024-12-11 RX ADMIN — RISPERIDONE 1 MG: 1 TABLET, FILM COATED ORAL at 08:12

## 2024-12-11 RX ADMIN — BUPRENORPHINE AND NALOXONE 1 FILM: 8; 2 FILM BUCCAL; SUBLINGUAL at 09:12

## 2024-12-11 RX ADMIN — GABAPENTIN 800 MG: 400 CAPSULE ORAL at 02:12

## 2024-12-11 RX ADMIN — GABAPENTIN 800 MG: 400 CAPSULE ORAL at 08:12

## 2024-12-11 RX ADMIN — TRAZODONE HYDROCHLORIDE 50 MG: 50 TABLET ORAL at 08:12

## 2024-12-11 RX ADMIN — CLONAZEPAM 1 MG: 1 TABLET ORAL at 09:12

## 2024-12-11 RX ADMIN — ACETAMINOPHEN 650 MG: 325 TABLET ORAL at 02:12

## 2024-12-11 RX ADMIN — CETIRIZINE HYDROCHLORIDE 10 MG: 5 TABLET ORAL at 09:12

## 2024-12-11 RX ADMIN — GABAPENTIN 800 MG: 400 CAPSULE ORAL at 09:12

## 2024-12-11 NOTE — PROGRESS NOTES
"PSYCHIATRY DAILY INPATIENT PROGRESS NOTE  SUBSEQUENT HOSPITAL VISIT    ENCOUNTER DATE: 12/11/2024  SITE: [facility]    DATE OF ADMISSION: 12/10/2024 12:22 AM  LENGTH OF STAY: 1 days      CHIEF COMPLAINT   Katelynn Crabtree is a 34 y.o. female, seen during daily walker rounds on the inpatient unit.  Katelynn Crabtree presented with the chief complaint of psychosis/paranoia, "They had these people try to kil lmy kids."       The patient was seen and examined. The chart was reviewed.     Reviewed notes from Rns, MD, SW, and LPN and labs from the last 24 hours.    The patient's case was discussed with the treatment team/care providers today including Rns, CTRS, NP, and SW    Staff reports severe (requiring PRN medications for non redirectable, agitated behavior in order to prevent harm to self or others) behavioral or management issues.     The patient has been compliant with treatment.      Subjective 12/11/2024       Today the patient reports that she needs to go home. She cried inconsolably throughout much of the interview.    She was focused on being discharged, "I don't want nothing bad to happen to my kids."    She variably endorsed and denied symptoms.    She remains a poor and inconsistent historian.     Possible withdrawals noted      The patient denies any side effects to medications.    At this time symptoms remains severe, functionally and behaviorally impairing and pt remains in need of continued acute inpatient hospitalization for both safety and stabilization.             Psychiatric ROS (observed, reported, or endorsed/denied):  Depressed mood - variable  Interest/pleasure/anhedonia: variable  Guilt/hopelessness/worthlessness - variable  Changes in Sleep - variable  Changes in Appetite - variable  Changes in Concentration - variable  Changes in Energy - variable  PMA/R- variable  Suicidal- active/passive ideations - No  Homicidal ideations: active/passive ideations - No    Hallucinations - " variable  Delusions - variable  Disorganized behavior - No  Disorganized speech - No  Negative symptoms - No    Elevated mood - No  Decreased need for sleep - variable  Grandiosity - No  Racing thoughts - variable  Impulsivity - variable  Irritability- variable  Increased energy - variable  Distractibility - variable  Increase in goal-directed activity or PMA- variable    Symptoms of DEMARCUS - variable  Symptoms of Panic Disorder- No  Symptoms of PTSD - No        Overall progress: Patient is showing no improvement on the Unit to date        Psychotherapy:  Target symptoms: mood disorder, psychosis  Why chosen therapy is appropriate versus another modality: relevant to diagnosis, patient responds to this modality, evidence based practice  Outcome monitoring methods: self-report, observation  Therapeutic intervention type: insight oriented psychotherapy, behavior modifying psychotherapy, supportive psychotherapy, interactive psychotherapy  Topics discussed/themes: building skills sets for symptom management, symptom recognition  The patient's response to the intervention is reluctant. The patient's progress toward treatment goals is limited.   Duration of intervention: 16 minutes.        Medical ROS  General ROS: negative  Ophthalmic ROS: negative  ENT ROS: negative  Allergy and Immunology ROS: negative  Hematological and Lymphatic ROS: negative  Endocrine ROS: negative  Respiratory ROS: no cough, shortness of breath, or wheezing  Cardiovascular ROS: no chest pain or dyspnea on exertion  Gastrointestinal ROS: no abdominal pain, change in bowel habits, or black or bloody stools  Genito-Urinary ROS: no dysuria, trouble voiding, or hematuria  Musculoskeletal ROS: negative  Neurological ROS: no TIA or stroke symptoms  Dermatological ROS: negative      PAST MEDICAL HISTORY   Past Medical History:   Diagnosis Date    Attention deficit hyperactivity disorder (ADHD) 7/4/2023    Bipolar disorder     Depression     History of  "psychiatric hospitalization     Hx of psychiatric care     Marsha     Psychiatric exam requested by authority     Psychiatric problem     Psychosis     Schizoaffective disorder     Therapy            PSYCHOTROPIC MEDICATIONS   Scheduled Meds:   buprenorphine-naloxone 8-2 mg  1 Film Sublingual TID    cetirizine  10 mg Oral Daily    clonazePAM  1 mg Oral TID    gabapentin  800 mg Oral TID    propranoloL  20 mg Oral BID    risperiDONE  0.5 mg Oral BID    traZODone  50 mg Oral QHS     Continuous Infusions:  PRN Meds:.  Current Facility-Administered Medications:     acetaminophen, 650 mg, Oral, Q6H PRN    aluminum-magnesium hydroxide-simethicone, 30 mL, Oral, Q6H PRN    benzonatate, 100 mg, Oral, TID PRN    benztropine mesylate, 2 mg, Intramuscular, Q8H PRN    haloperidoL, 5 mg, Oral, Q6H PRN **AND** diphenhydrAMINE, 50 mg, Oral, Q6H PRN **AND** LORazepam, 2 mg, Oral, Q6H PRN **AND** haloperidol lactate, 5 mg, Intramuscular, Q6H PRN **AND** diphenhydrAMINE, 50 mg, Intramuscular, Q6H PRN **AND** lorazepam, 2 mg, Intramuscular, Q6H PRN    loperamide, 2 mg, Oral, PRN    melatonin, 6 mg, Oral, Nightly PRN    nicotine, 1 patch, Transdermal, Daily PRN    ondansetron, 4 mg, Oral, Q8H PRN    promethazine, 25 mg, Oral, Q6H PRN        EXAMINATION    VITALS   Vitals:    12/10/24 0030 12/10/24 0101 12/10/24 0742 12/10/24 1930   BP: (!) 149/85  123/67 133/74   BP Location:   Left arm Left arm   Patient Position:   Sitting Sitting   Pulse: (!) 124  81 92   Resp: 20  18 18   Temp: 98.2 °F (36.8 °C)  98.2 °F (36.8 °C) 98.3 °F (36.8 °C)   TempSrc:   Oral Oral   SpO2: 97%  96% 95%   Weight:  69.9 kg (154 lb)     Height:  5' 3" (1.6 m)         Body mass index is 27.28 kg/m².      CONSTITUTIONAL  General Appearance: unremarkable, age appropriate     MUSCULOSKELETAL  Muscle Strength and Tone:no dyskinesia, no dystonia, no tremor, no tic  Abnormal Involuntary Movements: No  Gait and Station: non-ataxic     PSYCHIATRIC   Level of Consciousness: " awake and alert   Orientation: person, place, time, and situation  Grooming: Disheveled and Hospital garb  Psychomotor Behavior: reluctant to participate, psychomotor agitation, restless and fidgety , eye contact minimal  Speech: normal tone, normal rate, normal volume, rapid  Language: grossly intact, able to name, able to repeat  Mood: angry, anxious, depressed, and upset  Affect: Anxious, Consistent with mood, Congruent with thought, and Labile  Thought Process: derailed  Associations: intact   Thought Content: +delusions, denies SI, and denies HI  Perceptions: +AH and +VH  Memory: Able to recall past events, Remote intact, and Recent intact  Attention:Easily distracted and Impaired to some degree  Fund of Knowledge: Aware of current events and Vocabulary appropriate   Estimate if Intelligence:  Average based on work/education history, vocabulary and mental status exam  Insight: poor awareness of illness  Judgment: impaired due to nicolás/psychosis        DIAGNOSTIC TESTING   Laboratory Results  Recent Results (from the past 24 hours)   Hemoglobin A1c    Collection Time: 12/11/24  6:41 AM   Result Value Ref Range    Hemoglobin A1C 5.2 4.0 - 5.6 %    Estimated Avg Glucose 103 68 - 131 mg/dL   Lipid Panel    Collection Time: 12/11/24  6:41 AM   Result Value Ref Range    Cholesterol 133 120 - 199 mg/dL    Triglycerides 81 30 - 150 mg/dL    HDL 51 40 - 75 mg/dL    LDL Cholesterol 65.8 63.0 - 159.0 mg/dL    HDL/Cholesterol Ratio 38.3 20.0 - 50.0 %    Total Cholesterol/HDL Ratio 2.6 2.0 - 5.0    Non-HDL Cholesterol 82 mg/dL             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  ADHD      Polysubstance Dependence  Nicotine Dependence              PROBLEM LIST AND MANAGEMENT PLANS     Bipolar Disorder I mre mixed, severe with psychotic features: pt counseled  -pt recently received 156 mg  Invega BAUER- seeking collateral to verify dosage and date of administration; pt will likely need a dose increase for next dose  -start Risperdal 0.5 mg po BID- increase to 1 mg po BID  -pt declined resuming depakote  -consider trial of lithium  -symptoms may be in part secondary to withdrawal     Unspecified Anxiety Disorder: pt counseled  -resumed/continue off-label gabapenitn 800 mg po TID  -resumed/continue off-label Klonopin 1 mg po TID (steve try to aoer down/off given addiction issues)     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 The patient expresses understanding of the above and displays the capacity to agree with this treatment given said understanding. Patient also agrees that, currently, the benefits outweigh the risks and would like to pursue/continue treatment at this time.    Any medications being used off-label were discussed with the patient inclusive of the evidence base for the use of the medications and consent was obtained for the off-label use of the medication.       DISCHARGE PLANNING  Expected Disposition Plan: Home or Self Care      NEED FOR CONTINUED HOSPITALIZATION  Psychiatric illness continues to pose a potential threat to life or bodily function, of self or others, thereby requiring the need for continued inpatient psychiatric hospitalization: Yes, due to: significant psychotic thought disorder, aggressive neuroleptic titration, hallucinations, aggressive  behavior, and gravely disabled, as evidenced by:  Aggressed against Staff., Ongoing concerns with Active HI/Threatening behavior., Ongoing concerns with grave disability with patient unable to perform basic feeding, hygiene and dressing activities without significant constant support., and Ongoing concerns with perceptual aberrancy and paranoid persecutory delusions leading to potential harm of self or others.    Protective inpatient pyschiatric hospitalization required while a safe disposition plan is enacted: Yes    Patient stabilized and ready for discharge from inpatient psychiatric unit: No        STAFF:   Brandyn Looney MD  Psychiatry

## 2024-12-11 NOTE — PLAN OF CARE
POC reviewed this shift and is on going. Patient is withdrawn, depressed, anxious, agitated, irritable, angry, pacing, showing pressured speech, labile, and paranoid. Endorses Auditory Hallucinations, Visual Hallucinations, and Delusions. Denies Suicidal Ideation, Homicidal Ideation, Tactile Hallucinations, and Gustatory Hallucinations. Easily agitated,refusing suboxone. States that we are killing her. No outburst this shift. Pt continues to be medication compliant and staff will continue to monitor pt closely while on the unit.

## 2024-12-11 NOTE — NURSING
Pt has been acting out most of the morning.  Pt screaming in her room, running up and down the halls screaming, running from her imaginary friend whom she states is following her.  Pt disruptive to milieu.  Dr rico notified  because zyprexa given earlier this morning at 0900 for agitation was ineffective.  New orders given for haldol, ativan, benadryl IM or PO.  See emar for dosages.  Pt took meds without difficulty.  Will monitor effectiveness.  Pt has needed constant redirection since the start of the shift.

## 2024-12-11 NOTE — NURSING
"Pt continues to need constant redirection.   Continues to scream out of her room door, cry off and on loudly, and slam her door at intervals.  When  this writer asks pt what we can do to help her pt stated "nothing" and walks away briskly.   Pt continues to walk the halls screaming obscenities and other pts on unit continue to be fearful of her.  Haldol, ativan, benadryl po ineffective at this time.  Will cont to monitor pt for safety.     "

## 2024-12-11 NOTE — PLAN OF CARE
Problem: Adult Behavioral Health Plan of Care  Goal: Plan of Care Review  Outcome: Progressing  Goal: Patient-Specific Goal (Individualization)  Outcome: Progressing  Goal: Adheres to Safety Considerations for Self and Others  Outcome: Progressing  Goal: Absence of New-Onset Illness or Injury  Outcome: Progressing  Goal: Optimized Coping Skills in Response to Life Stressors  Outcome: Progressing  Goal: Develops/Participates in Therapeutic [place] to Support Successful Transition  Outcome: Progressing  Goal: Rounds/Family Conference  Outcome: Progressing

## 2024-12-11 NOTE — PLAN OF CARE
SW attempted PSA interview but unsuccessful. Pt is clinically inappropriate at this time. Upon entering Pt room pt was crying hysterically and hard to follow. Pt did ask question in relation to be discharged. SW will re attempt at later date.

## 2024-12-11 NOTE — NURSING
Pt continues to cry, scream out of her room and scream obscenities at times but easier to redirect.  Pt did lie down and close her eyes for approximately 10 minutes.  Will cont to monitor and redirect as needed.

## 2024-12-11 NOTE — PLAN OF CARE
Pt. Noticed to be pacing unit and anxious at times. Pt. Withdrawn and only answering questions. She did attend to her ADLs by taking a shower tonight. She took her not medications by mouth but refused Buprenorphine-naloxone 8-2 mg SL, she stated that this medication makes her nauseous. She went to bed without a snack tonight. Will cont. To monitor Pt.  Problem: Adult Behavioral Health Plan of Care  Goal: Absence of New-Onset Illness or Injury  Outcome: Progressing     Problem: Manic or Hypomanic Signs/Symptoms  Goal: Improved Impulse Control (Manic/Hypomanic Signs/Symptoms)  Outcome: Progressing

## 2024-12-12 PROCEDURE — 11400000 HC PSYCH PRIVATE ROOM

## 2024-12-12 PROCEDURE — 25000003 PHARM REV CODE 250: Performed by: INTERNAL MEDICINE

## 2024-12-12 PROCEDURE — 99233 SBSQ HOSP IP/OBS HIGH 50: CPT | Mod: AF,HB,, | Performed by: PSYCHIATRY & NEUROLOGY

## 2024-12-12 PROCEDURE — 25000003 PHARM REV CODE 250: Performed by: PSYCHIATRY & NEUROLOGY

## 2024-12-12 RX ORDER — BUPRENORPHINE AND NALOXONE 8; 2 MG/1; MG/1
1 FILM, SOLUBLE BUCCAL; SUBLINGUAL 2 TIMES DAILY
Status: DISCONTINUED | OUTPATIENT
Start: 2024-12-12 | End: 2024-12-13

## 2024-12-12 RX ORDER — GABAPENTIN 300 MG/1
900 CAPSULE ORAL 3 TIMES DAILY
Status: DISCONTINUED | OUTPATIENT
Start: 2024-12-12 | End: 2024-12-18 | Stop reason: HOSPADM

## 2024-12-12 RX ADMIN — CLONAZEPAM 1 MG: 1 TABLET ORAL at 03:12

## 2024-12-12 RX ADMIN — RISPERIDONE 1 MG: 1 TABLET, FILM COATED ORAL at 09:12

## 2024-12-12 RX ADMIN — RISPERIDONE 1 MG: 1 TABLET, FILM COATED ORAL at 08:12

## 2024-12-12 RX ADMIN — GABAPENTIN 900 MG: 300 CAPSULE ORAL at 09:12

## 2024-12-12 RX ADMIN — ACETAMINOPHEN 650 MG: 325 TABLET ORAL at 06:12

## 2024-12-12 RX ADMIN — TRAZODONE HYDROCHLORIDE 50 MG: 50 TABLET ORAL at 09:12

## 2024-12-12 RX ADMIN — PROPRANOLOL HYDROCHLORIDE 20 MG: 20 TABLET ORAL at 09:12

## 2024-12-12 RX ADMIN — GABAPENTIN 900 MG: 300 CAPSULE ORAL at 03:12

## 2024-12-12 RX ADMIN — CLONAZEPAM 1 MG: 1 TABLET ORAL at 08:12

## 2024-12-12 RX ADMIN — GABAPENTIN 800 MG: 400 CAPSULE ORAL at 08:12

## 2024-12-12 RX ADMIN — PROPRANOLOL HYDROCHLORIDE 20 MG: 20 TABLET ORAL at 08:12

## 2024-12-12 RX ADMIN — CLONAZEPAM 1 MG: 1 TABLET ORAL at 09:12

## 2024-12-12 RX ADMIN — CETIRIZINE HYDROCHLORIDE 10 MG: 5 TABLET ORAL at 08:12

## 2024-12-12 NOTE — PLAN OF CARE
Problem: Adult Behavioral Health Plan of Care  Goal: Optimized Coping Skills in Response to Life Stressors  Intervention: Promote Effective Coping Strategies  Flowsheets (Taken 12/12/2024 9006)  Supportive Measures:   active listening utilized   self-responsibility promoted   verbalization of feelings encouraged   self-reflection promoted       Behavior: alert, hyper-verbal            Intervention: In this CBT-focused group LMSW facilitated discussion on the importance of boundaries and different types of boundaries.  Each patient was asked to discuss what boundaries, physical, emotional, intellectual, sexual, time, or material they need to better establish in order to meet their treatment goals.           Response: Pt attended group and participated in group. Pt verbalized needed to establish heathy boundaries with people that do not have good intentions and not listening to what others think about her.           Plan: Continue to encourage pt to participate in process groups to verbalize feelings and develop healthy coping skills.

## 2024-12-12 NOTE — NURSING
POC reviewed this shift and is ongoing.  Pt was mostly medication compliant this shift, refusing suboxone only.  Pt was isolated to self and room, out only for medications and snack.  Pt minimal interactions with staff and no interactions with peers.  Pt did accept a snack.
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
## 2024-12-12 NOTE — PLAN OF CARE
Problem: Adult Behavioral Health Plan of Care  Goal: Plan of Care Review  Outcome: Progressing  Goal: Patient-Specific Goal (Individualization)  Outcome: Progressing  Goal: Adheres to Safety Considerations for Self and Others  Outcome: Progressing  Goal: Absence of New-Onset Illness or Injury  Outcome: Progressing  Goal: Optimized Coping Skills in Response to Life Stressors  Outcome: Progressing     Problem: Psychotic Signs/Symptoms  Goal: Improved Behavioral Control (Psychotic Signs/Symptoms)  Outcome: Progressing  Goal: Optimal Cognitive Function (Psychotic Signs/Symptoms)  Outcome: Progressing  Goal: Increased Participation and Engagement (Psychotic Signs/Symptoms)  Outcome: Progressing  Goal: Improved Mood Symptoms (Psychotic Signs/Symptoms)  Outcome: Progressing  Goal: Improved Psychomotor Symptoms (Psychotic Signs/Symptoms)  Outcome: Progressing  Goal: Decreased Sensory Symptoms (Psychotic Signs/Symptoms)  Outcome: Progressing  Goal: Improved Sleep (Psychotic Signs/Symptoms)  Outcome: Progressing  Goal: Enhanced Social, Occupational or Functional Skills (Psychotic Signs/Symptoms)  Outcome: Progressing     Problem: Violence Risk or Actual  Goal: Anger and Impulse Control  Outcome: Progressing     Problem: Anxiety Signs/Symptoms  Goal: Optimized Energy Level (Anxiety Signs/Symptoms)  Outcome: Progressing  Goal: Optimized Cognitive Function (Anxiety Signs/Symptoms)  Outcome: Progressing  Goal: Improved Mood Symptoms (Anxiety Signs/Symptoms)  Outcome: Progressing  Goal: Improved Sleep (Anxiety Signs/Symptoms)  Outcome: Progressing  Goal: Enhanced Social, Occupational or Functional Skills (Anxiety Signs/Symptoms)  Outcome: Progressing  Goal: Improved Somatic Symptoms (Anxiety Signs/Symptoms)  Outcome: Progressing     Problem: Manic or Hypomanic Signs/Symptoms  Goal: Improved Impulse Control (Manic/Hypomanic Signs/Symptoms)  Outcome: Progressing  Goal: Optimized Cognitive Function (Manic/Hypomanic  Signs/Symptoms)  Outcome: Progressing  Goal: Improved Mood Symptoms (Manic/Hypomanic Signs/Symptoms)  Outcome: Progressing  Goal: Optimized Nutrition Intake (Manic or Hypomanic Signs/Symptoms)  Outcome: Progressing  Goal: Improved Psychomotor Symptoms (Manic/Hypomanic Signs/Symptoms)  Outcome: Progressing  Goal: Improved Sleep (Manic/Hypomanic Signs/Symptoms)  Outcome: Progressing  Goal: Enhanced Social, Occupational or Functional Skills (Manic/Hypomanic Signs/Symptoms)  Outcome: Progressing

## 2024-12-12 NOTE — PLAN OF CARE
"   12/12/24 1637   Step 1: Warning Signs   Warning Sign 1 finding a loved one dead   Warning Sign 2 everybody wanting to kill me   Warning Sign 3 thinking/feeling my son is dead   Step 2: Internal coping strategies - Things I can do to take my mind off my problems without contacting another person:   Coping Strategy 1 listen to music   Coping Strategy 2 go for a walk   Coping Strategy 3 taking a nap   Step 3: People and social settings that provide distraction:   1. Name Hoang, Royer, and Anusha (kids)   2. Name n/a   3. Place restaurants   4. Place mall    Step 4: People whom I can ask for help:   1. Person n/a   2. Person n/a   3. Person n/a   Step 5: Professionals or agencies I can contact during a crisis:   1. Clinician Name [facility] [address], [place] [address]       Phone [phone]   3. Suicide Prevention Lifeline: 988 suicide prevention lifeline 988   4. [facility] Emergency Service       911       Emergency Services Phone warm line [phone] wed-sun 5pm-10pm   Step 6: Making the environment safer (plan for lethal means safety)   Safe Environment Plan "for the people to leave me alone and not want to kill me anymore"   Safe Environment Optional: What is most important to me and worth living for? my life, my kids   Safety Plan Tasks   Provided a Hard Copy to the Patient Y   Explained How to Follow the Steps Y   Discussed Facilitators and Barriers Y       "

## 2024-12-12 NOTE — PLAN OF CARE
Problem: Adult Behavioral Health Plan of Care  Goal: Develops/Participates in Therapeutic [place] to Support Successful Transition  Intervention: Mutually Develop Transition Plan  Flowsheets (Taken 12/12/2024 6795)  Current Discharge Risk:   psychiatric illness   lack of support system/caregiver  Readmission Within the Last 30 Days: previous discharge plan unsuccessful  Outpatient/Agency/Support Group Needs:   outpatient medication management   outpatient counseling   outpatient psychiatric care (specify)   intensive outpatient services  Transition Support: follow-up care discussed  Anticipated Discharge Disposition: home or self-care  Transportation Concerns:   no car   rides, unreliable from others  Patient/Family Anticipated Services at Transition:   mental health services   outpatient care  Patient/Family Anticipates Transition to: home  Transportation Anticipated: health plan transportation  Concerns to be Addressed: mental health

## 2024-12-12 NOTE — PLAN OF CARE
12/12/24 1631   Discharge Assessment   Assessment Type Discharge Planning Assessment   Confirmed/corrected address, phone number and insurance Yes   Confirmed Demographics Correct on Facesheet   Source of Information patient;health record   When was your last doctors appointment?   (last month)   Reason For Admission psychosis   People in Home alone   Do you expect to return to your current living situation? Yes   Do you have help at home or someone to help you manage your care at home? No   Prior to hospitilization cognitive status: Unable to Assess   Current cognitive status: Alert/Oriented;No Deficits   Walking or Climbing Stairs Difficulty no   Dressing/Bathing Difficulty no   Do you have any problems with:   (n/a)   Home Accessibility not wheelchair accessible   Home Layout Able to live on 1st floor   Equipment Currently Used at Home none   Readmission within 30 days? Yes   Patient currently being followed by outpatient case management? No   Do you currently have service(s) that help you manage your care at home? No   Do you take prescription medications? Yes   Do you have prescription coverage? Yes   Do you have any problems affording any of your prescribed medications? No   Is the patient taking medications as prescribed? yes   Who is going to help you get home at discharge? health plan transportation   How do you get to doctors appointments? public transportation   Are you on dialysis? No   Do you take coumadin? No   Discharge Plan A Home   DME Needed Upon Discharge  none   Discharge Plan discussed with: Patient   Transition of Care Barriers Mental illness;No family/friends to help   SDOH Lack of primary/family support   Physical Activity   On average, how many days per week do you engage in moderate to strenuous exercise (like a brisk walk)? 7 days   On average, how many minutes do you engage in exercise at this level? 30 min   Financial Resource Strain   How hard is it for you to pay for the very basics  like food, housing, medical care, and heating? Very Hard   Housing Stability   In the last 12 months, was there a time when you were not able to pay the mortgage or rent on time?   (LIVES IN PERMANENT SUPPORTIVE HOUSING)   At any time in the past 12 months, were you homeless or living in a shelter (including now)? N   Transportation Needs   Has the lack of transportation kept you from medical appointments, meetings, work or from getting things needed for daily living? Yes, it has kept me from medical appointments or from getting my medications.   Food Insecurity   Within the past 12 months, you worried that your food would run out before you got the money to buy more. Never true   Within the past 12 months, the food you bought just didn't last and you didn't have money to get more. Never true   Stress   Do you feel stress - tense, restless, nervous, or anxious, or unable to sleep at night because your mind is troubled all the time - these days? Very much   Social Isolation   How often do you feel lonely or isolated from those around you?  Always   Alcohol Use   Q1: How often do you have a drink containing alcohol? Never   Q2: How many drinks containing alcohol do you have on a typical day when you are drinking? None   Q3: How often do you have six or more drinks on one occasion? Never   Utilities   In the past 12 months has the electric, gas, oil, or water company threatened to shut off services in your home? No   Health Literacy   How often do you need to have someone help you when you read instructions, pamphlets, or other written material from your doctor or pharmacy? Always

## 2024-12-12 NOTE — PLAN OF CARE
POC reviewed this shift and is on going. Patient is withdrawn, depressed, anxious, agitated, irritable, showing pressured speech, labile, and paranoid. Endorses Auditory Hallucinations, Visual Hallucinations, and Delusions. Denies Suicidal Ideation, Homicidal Ideation, Tactile Hallucinations, and Gustatory Hallucinations. Easily agitated,no outburst this shift. Pt continues to be medication compliant and staff will continue to monitor pt closely while on the unit.

## 2024-12-12 NOTE — PROGRESS NOTES
"PSYCHIATRY DAILY INPATIENT PROGRESS NOTE  SUBSEQUENT HOSPITAL VISIT    ENCOUNTER DATE: 12/12/2024  SITE: [facility]    DATE OF ADMISSION: 12/10/2024 12:22 AM  LENGTH OF STAY: 2 days      CHIEF COMPLAINT   Katelynn Crabtree is a 34 y.o. female, seen during daily walker rounds on the inpatient unit.  Katelynn Crabtree presented with the chief complaint of psychosis/paranoia, "They had these people try to kil lmy kids."       The patient was seen and examined. The chart was reviewed.     Reviewed notes from Rns, MD, SW, and LPN and labs from the last 24 hours.    The patient's case was discussed with the treatment team/care providers today including Rns, CTRS, NP, and SW    Staff reports severe (requiring PRN medications for non redirectable, agitated behavior in order to prevent harm to self or others) behavioral or management issues.     The patient has been compliant with treatment.      Subjective 12/12/2024       Today the patient again reports that she needs to go home. She was less labile in today's interview.    She was again focused on being discharged, "I don't want nothing bad to happen to my kids."    She variably endorsed and denied symptoms.    She remains a poor and inconsistent historian. She did seem calmer today    Possible withdrawals again noted      The patient denies any side effects to medications.    At this time symptoms remains severe, functionally and behaviorally impairing and pt remains in need of continued acute inpatient hospitalization for both safety and stabilization.             Psychiatric ROS (observed, reported, or endorsed/denied):  Depressed mood - variable  Interest/pleasure/anhedonia: variable  Guilt/hopelessness/worthlessness - variable  Changes in Sleep - variable  Changes in Appetite - variable  Changes in Concentration - variable  Changes in Energy - variable  PMA/R- variable  Suicidal- active/passive ideations - No  Homicidal ideations: active/passive ideations - " No    Hallucinations - variable  Delusions - variable  Disorganized behavior - No  Disorganized speech - No  Negative symptoms - No    Elevated mood - No  Decreased need for sleep - variable  Grandiosity - No  Racing thoughts - variable  Impulsivity - variable  Irritability- variable  Increased energy - variable  Distractibility - variable  Increase in goal-directed activity or PMA- variable    Symptoms of DEMARCUS - variable  Symptoms of Panic Disorder- No  Symptoms of PTSD - No        Overall progress: Patient is showing no improvement on the Unit to date        Psychotherapy:  Target symptoms: mood disorder, psychosis  Why chosen therapy is appropriate versus another modality: relevant to diagnosis, patient responds to this modality, evidence based practice  Outcome monitoring methods: self-report, observation  Therapeutic intervention type: insight oriented psychotherapy, behavior modifying psychotherapy, supportive psychotherapy, interactive psychotherapy  Topics discussed/themes: building skills sets for symptom management, symptom recognition  The patient's response to the intervention is reluctant. The patient's progress toward treatment goals is limited.   Duration of intervention: 5 minutes.        Medical ROS  General ROS: negative  Ophthalmic ROS: negative  ENT ROS: negative  Allergy and Immunology ROS: negative  Hematological and Lymphatic ROS: negative  Endocrine ROS: negative  Respiratory ROS: no cough, shortness of breath, or wheezing  Cardiovascular ROS: no chest pain or dyspnea on exertion  Gastrointestinal ROS: no abdominal pain, change in bowel habits, or black or bloody stools  Genito-Urinary ROS: no dysuria, trouble voiding, or hematuria  Musculoskeletal ROS: negative  Neurological ROS: no TIA or stroke symptoms  Dermatological ROS: negative      PAST MEDICAL HISTORY   Past Medical History:   Diagnosis Date    Attention deficit hyperactivity disorder (ADHD) 7/4/2023    Bipolar disorder      Depression     History of psychiatric hospitalization     Hx of psychiatric care     Marsha     Psychiatric exam requested by authority     Psychiatric problem     Psychosis     Schizoaffective disorder     Therapy            PSYCHOTROPIC MEDICATIONS   Scheduled Meds:   buprenorphine-naloxone 8-2 mg  1 Film Sublingual TID    cetirizine  10 mg Oral Daily    clonazePAM  1 mg Oral TID    gabapentin  800 mg Oral TID    propranoloL  20 mg Oral BID    risperiDONE  1 mg Oral BID    traZODone  50 mg Oral QHS     Continuous Infusions:  PRN Meds:.  Current Facility-Administered Medications:     acetaminophen, 650 mg, Oral, Q6H PRN    aluminum-magnesium hydroxide-simethicone, 30 mL, Oral, Q6H PRN    benzonatate, 100 mg, Oral, TID PRN    benztropine mesylate, 2 mg, Intramuscular, Q8H PRN    haloperidoL, 5 mg, Oral, Q6H PRN **AND** diphenhydrAMINE, 50 mg, Oral, Q6H PRN **AND** LORazepam, 2 mg, Oral, Q6H PRN **AND** haloperidol lactate, 5 mg, Intramuscular, Q6H PRN **AND** diphenhydrAMINE, 50 mg, Intramuscular, Q6H PRN **AND** lorazepam, 2 mg, Intramuscular, Q6H PRN    loperamide, 2 mg, Oral, PRN    melatonin, 6 mg, Oral, Nightly PRN    nicotine, 1 patch, Transdermal, Daily PRN    ondansetron, 4 mg, Oral, Q8H PRN    promethazine, 25 mg, Oral, Q6H PRN        EXAMINATION    VITALS   Vitals:    12/10/24 0742 12/10/24 1930 12/11/24 1912 12/12/24 0724   BP: 123/67 133/74 129/83 (!) 99/58   BP Location: Left arm Left arm  Right arm   Patient Position: Sitting Sitting Lying Sitting   Pulse: 81 92 94 99   Resp: 18 18 20 20   Temp: 98.2 °F (36.8 °C) 98.3 °F (36.8 °C) 98.2 °F (36.8 °C) 98.3 °F (36.8 °C)   TempSrc: Oral Oral Oral Oral   SpO2: 96% 95% 98% (!) 94%   Weight:       Height:           Body mass index is 27.28 kg/m².      CONSTITUTIONAL  General Appearance: unremarkable, age appropriate     MUSCULOSKELETAL  Muscle Strength and Tone:no dyskinesia, no dystonia, no tremor, no tic  Abnormal Involuntary Movements: No  Gait and Station:  non-ataxic     PSYCHIATRIC   Level of Consciousness: awake and alert   Orientation: person, place, time, and situation  Grooming: Disheveled and Hospital garb  Psychomotor Behavior: reluctant to participate, psychomotor agitation, restless and fidgety , eye contact minimal  Speech: normal tone, normal rate, normal volume, rapid  Language: grossly intact, able to name, able to repeat  Mood: angry, anxious, depressed, and upset  Affect: Anxious, Consistent with mood, Congruent with thought, and Labile  Thought Process: derailed  Associations: intact   Thought Content: +delusions, denies SI, and denies HI  Perceptions: +AH and +VH  Memory: Able to recall past events, Remote intact, and Recent intact  Attention:Easily distracted and Impaired to some degree  Fund of Knowledge: Aware of current events and Vocabulary appropriate   Estimate if Intelligence:  Average based on work/education history, vocabulary and mental status exam  Insight: poor awareness of illness  Judgment: impaired due to nicolás/psychosis        DIAGNOSTIC TESTING   Laboratory Results  No results found for this or any previous visit (from the past 24 hours).            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  ADHD      Polysubstance Dependence  Nicotine Dependence              PROBLEM LIST AND MANAGEMENT PLANS     Bipolar Disorder I mre mixed, severe with psychotic features: pt counseled  -pt recently received 156 mg Invega BAUER- seeking collateral to verify dosage and date of administration; pt will likely need a dose increase for next dose  -start Risperdal 0.5 mg po BID- increase to 1 mg po BID  -pt declined resuming depakote  -consider trial of lithium  -symptoms may be in part secondary to withdrawal     Unspecified Anxiety Disorder: pt counseled  -resumed/continue off-label gabapenitn 800 mg po TID- increase to 900  mg po TID  -resumed/continue off-label Klonopin 1 mg po TID (steve try to aoer down/off given addiction issues)     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: pt counseled  -restarted/continue home trazodone 50 mg po HS     ADHD: pt counseled  -hold vyvanse- likely worsening symptoms   -consider trial of Atomoextine     Polysubstance Dependence: pt counseled  -restart/continue home Suboxone 8-2 mg SL po TID- decrease to BID today (pt was refusing dosages over the last 24 hours; she requested to taper off)- will taper off as tolerated     Nicotine Dependence: pt counseled  -started/continue nicotine 14 mg patch dermal q day          Discussed diagnosis, risks and benefits of proposed treatment vs alternative treatments vs no treatment, potential side effects of these treatments and the inherent unpredictability of treatment. The patient expresses understanding of the above and displays the capacity to agree with this treatment given said understanding. Patient also agrees that, currently, the benefits outweigh the risks and would like to pursue/continue treatment at this time.    Any medications being used off-label were discussed with the patient inclusive of the evidence base for the use of the medications and consent was obtained for the off-label use of the medication.       DISCHARGE PLANNING  Expected Disposition Plan: Home or Self Care      NEED FOR CONTINUED HOSPITALIZATION  Psychiatric illness continues to pose a potential threat to life or bodily function, of self or others, thereby requiring the need for continued inpatient psychiatric hospitalization: Yes, due to: significant psychotic thought disorder, aggressive neuroleptic titration, hallucinations, aggressive behavior, and gravely disabled, as evidenced by:  Aggressed against Staff., Ongoing concerns with Active HI/Threatening behavior., Ongoing concerns with grave disability with patient unable to perform basic feeding, hygiene and dressing activities  without significant constant support., and Ongoing concerns with perceptual aberrancy and paranoid persecutory delusions leading to potential harm of self or others.    Protective inpatient pyschiatric hospitalization required while a safe disposition plan is enacted: Yes    Patient stabilized and ready for discharge from inpatient psychiatric unit: No        STAFF:   Brandyn Looney MD  Psychiatry

## 2024-12-13 PROCEDURE — 11400000 HC PSYCH PRIVATE ROOM

## 2024-12-13 PROCEDURE — 90833 PSYTX W PT W E/M 30 MIN: CPT | Mod: AF,HB,, | Performed by: PSYCHIATRY & NEUROLOGY

## 2024-12-13 PROCEDURE — 25000003 PHARM REV CODE 250: Performed by: INTERNAL MEDICINE

## 2024-12-13 PROCEDURE — 25000003 PHARM REV CODE 250: Performed by: PSYCHIATRY & NEUROLOGY

## 2024-12-13 PROCEDURE — 99233 SBSQ HOSP IP/OBS HIGH 50: CPT | Mod: AF,HB,, | Performed by: PSYCHIATRY & NEUROLOGY

## 2024-12-13 RX ORDER — DIVALPROEX SODIUM 250 MG/1
500 TABLET, DELAYED RELEASE ORAL 2 TIMES DAILY
Status: DISCONTINUED | OUTPATIENT
Start: 2024-12-13 | End: 2024-12-18 | Stop reason: HOSPADM

## 2024-12-13 RX ADMIN — GABAPENTIN 900 MG: 300 CAPSULE ORAL at 08:12

## 2024-12-13 RX ADMIN — CLONAZEPAM 1 MG: 1 TABLET ORAL at 03:12

## 2024-12-13 RX ADMIN — GABAPENTIN 900 MG: 300 CAPSULE ORAL at 03:12

## 2024-12-13 RX ADMIN — RISPERIDONE 1.5 MG: 1 TABLET, FILM COATED ORAL at 08:12

## 2024-12-13 RX ADMIN — CLONAZEPAM 1 MG: 1 TABLET ORAL at 08:12

## 2024-12-13 RX ADMIN — PROPRANOLOL HYDROCHLORIDE 20 MG: 20 TABLET ORAL at 08:12

## 2024-12-13 RX ADMIN — ACETAMINOPHEN 650 MG: 325 TABLET ORAL at 03:12

## 2024-12-13 RX ADMIN — RISPERIDONE 1 MG: 1 TABLET, FILM COATED ORAL at 08:12

## 2024-12-13 RX ADMIN — DIVALPROEX SODIUM 500 MG: 250 TABLET, DELAYED RELEASE ORAL at 08:12

## 2024-12-13 RX ADMIN — CETIRIZINE HYDROCHLORIDE 10 MG: 5 TABLET ORAL at 08:12

## 2024-12-13 RX ADMIN — TRAZODONE HYDROCHLORIDE 50 MG: 50 TABLET ORAL at 08:12

## 2024-12-13 RX ADMIN — DIVALPROEX SODIUM 500 MG: 250 TABLET, DELAYED RELEASE ORAL at 09:12

## 2024-12-13 NOTE — NURSING
Patient refused Suboxone.  Medication returned.  No behavior concerns.  Patient slept through the night.

## 2024-12-13 NOTE — PLAN OF CARE
Problem: Adult Behavioral Health Plan of Care  Goal: Plan of Care Review  Outcome: Progressing     Problem: Adult Behavioral Health Plan of Care  Goal: Patient-Specific Goal (Individualization)  Outcome: Progressing     Problem: Adult Behavioral Health Plan of Care  Goal: Adheres to Safety Considerations for Self and Others  Outcome: Progressing     Problem: Psychotic Signs/Symptoms  Goal: Improved Behavioral Control (Psychotic Signs/Symptoms)  Outcome: Progressing     Problem: Psychotic Signs/Symptoms  Goal: Improved Sleep (Psychotic Signs/Symptoms)  Outcome: Progressing     Problem: Violence Risk or Actual  Goal: Anger and Impulse Control  Outcome: Progressing     Problem: Anxiety Signs/Symptoms  Goal: Optimized Energy Level (Anxiety Signs/Symptoms)  Outcome: Progressing     Problem: Anxiety Signs/Symptoms  Goal: Improved Sleep (Anxiety Signs/Symptoms)  Outcome: Progressing     Problem: Manic or Hypomanic Signs/Symptoms  Goal: Improved Mood Symptoms (Manic/Hypomanic Signs/Symptoms)  Outcome: Progressing

## 2024-12-13 NOTE — NURSING
Plan of care reviewed. Pt has been walking the halls most of the afternoon.  Pt interacts with staff and select peers without difficulty.  Pt continues to be focused on discharge.  Pt has stated to staff members that she thinks her kids are in harms way but has not even been in contact with them today.  Pt remains anxious, agitated; mood labile.  Pt continued to refused suboxone so discontinued today as per physician.  See emar. Pt denies any withdrawal symptoms.  Pt refused to attend group session today despite staff encouragement.  Pt taking current meds without side effects noted.  Appetite adequate.  Pt instructed to call for any needs or concerns at all.  Verbalized understanding. Will cont to monitor for safety.    Patient care ongoing.

## 2024-12-13 NOTE — PROGRESS NOTES
"PSYCHIATRY DAILY INPATIENT PROGRESS NOTE  SUBSEQUENT HOSPITAL VISIT    ENCOUNTER DATE: 12/13/2024  SITE: [facility]    DATE OF ADMISSION: 12/10/2024 12:22 AM  LENGTH OF STAY: 3 days      CHIEF COMPLAINT   Katelynn Crabtree is a 34 y.o. female, seen during daily walker rounds on the inpatient unit.  Katelynn Crabtree presented with the chief complaint of psychosis/paranoia, "They had these people try to kill my kids."       The patient was seen and examined. The chart was reviewed.     Reviewed notes from Rns, MD, SW, and LPN and labs from the last 24 hours.    The patient's case was discussed with the treatment team/care providers today including Rns, CTRS, NP, and SW    Staff reports less behavioral or management issues.     The patient has been partially compliant with treatment.      Subjective 12/13/2024       Today the patient again reports that she needs to go home. She was less labile in today's interview- mood lability persist and remains severe.    She was again focused on being discharged, "I don't want nothing bad to happen to my kids." She remains convinced that her children are in danger.    She variably endorsed and denied symptoms.    She remains a poor and inconsistent historian. She did seem somewhat calmer today    Possible withdrawals again noted. She has been inconsistently taking suboxone.       The patient denies any side effects to medications.    At this time symptoms remains severe, functionally and behaviorally impairing and pt remains in need of continued acute inpatient hospitalization for both safety and stabilization.             Psychiatric ROS (observed, reported, or endorsed/denied):  Depressed mood - variable  Interest/pleasure/anhedonia: variable  Guilt/hopelessness/worthlessness - variable  Changes in Sleep - variable  Changes in Appetite - variable  Changes in Concentration - variable  Changes in Energy - variable  PMA/R- variable  Suicidal- active/passive ideations - " No  Homicidal ideations: active/passive ideations - No    Hallucinations - variable  Delusions - variable  Disorganized behavior - No  Disorganized speech - No  Negative symptoms - No    Elevated mood - No  Decreased need for sleep - variable  Grandiosity - No  Racing thoughts - variable  Impulsivity - variable  Irritability- variable  Increased energy - variable  Distractibility - variable  Increase in goal-directed activity or PMA- variable    Symptoms of DEMARCUS - variable  Symptoms of Panic Disorder- No  Symptoms of PTSD - No        Overall progress: Patient is showing no improvement on the Unit to date        Psychotherapy:  Target symptoms: mood disorder, psychosis  Why chosen therapy is appropriate versus another modality: relevant to diagnosis, patient responds to this modality, evidence based practice  Outcome monitoring methods: self-report, observation  Therapeutic intervention type: insight oriented psychotherapy, behavior modifying psychotherapy, supportive psychotherapy, interactive psychotherapy  Topics discussed/themes: building skills sets for symptom management, symptom recognition  The patient's response to the intervention is reluctant. The patient's progress toward treatment goals is limited.   Duration of intervention: 16 minutes.        Medical ROS  General ROS: negative  Ophthalmic ROS: negative  ENT ROS: negative  Allergy and Immunology ROS: negative  Hematological and Lymphatic ROS: negative  Endocrine ROS: negative  Respiratory ROS: no cough, shortness of breath, or wheezing  Cardiovascular ROS: no chest pain or dyspnea on exertion  Gastrointestinal ROS: no abdominal pain, change in bowel habits, or black or bloody stools  Genito-Urinary ROS: no dysuria, trouble voiding, or hematuria  Musculoskeletal ROS: negative  Neurological ROS: no TIA or stroke symptoms  Dermatological ROS: negative      PAST MEDICAL HISTORY   Past Medical History:   Diagnosis Date    Attention deficit hyperactivity  disorder (ADHD) 7/4/2023    Bipolar disorder     Depression     History of psychiatric hospitalization     Hx of psychiatric care     Marsha     Psychiatric exam requested by authority     Psychiatric problem     Psychosis     Schizoaffective disorder     Therapy            PSYCHOTROPIC MEDICATIONS   Scheduled Meds:   buprenorphine-naloxone 8-2 mg  1 Film Sublingual BID    cetirizine  10 mg Oral Daily    clonazePAM  1 mg Oral TID    gabapentin  900 mg Oral TID    propranoloL  20 mg Oral BID    risperiDONE  1 mg Oral BID    traZODone  50 mg Oral QHS     Continuous Infusions:  PRN Meds:.  Current Facility-Administered Medications:     acetaminophen, 650 mg, Oral, Q6H PRN    aluminum-magnesium hydroxide-simethicone, 30 mL, Oral, Q6H PRN    benzonatate, 100 mg, Oral, TID PRN    benztropine mesylate, 2 mg, Intramuscular, Q8H PRN    haloperidoL, 5 mg, Oral, Q6H PRN **AND** diphenhydrAMINE, 50 mg, Oral, Q6H PRN **AND** LORazepam, 2 mg, Oral, Q6H PRN **AND** haloperidol lactate, 5 mg, Intramuscular, Q6H PRN **AND** diphenhydrAMINE, 50 mg, Intramuscular, Q6H PRN **AND** lorazepam, 2 mg, Intramuscular, Q6H PRN    loperamide, 2 mg, Oral, PRN    melatonin, 6 mg, Oral, Nightly PRN    nicotine, 1 patch, Transdermal, Daily PRN    ondansetron, 4 mg, Oral, Q8H PRN    promethazine, 25 mg, Oral, Q6H PRN        EXAMINATION    VITALS   Vitals:    12/11/24 1912 12/12/24 0724 12/12/24 1914 12/13/24 0715   BP: 129/83 (!) 99/58 (!) 130/58 (!) 106/58   BP Location:  Right arm Left arm Left arm   Patient Position: Lying Sitting Sitting Sitting   Pulse: 94 99 73 85   Resp: 20 20 18 20   Temp: 98.2 °F (36.8 °C) 98.3 °F (36.8 °C) 98.8 °F (37.1 °C) 98.1 °F (36.7 °C)   TempSrc: Oral Oral Oral Oral   SpO2: 98% (!) 94% 96% 96%   Weight:       Height:           Body mass index is 27.28 kg/m².      CONSTITUTIONAL  General Appearance: unremarkable, age appropriate     MUSCULOSKELETAL  Muscle Strength and Tone:no dyskinesia, no dystonia, no tremor, no  tic  Abnormal Involuntary Movements: No  Gait and Station: non-ataxic     PSYCHIATRIC   Level of Consciousness: awake and alert   Orientation: person, place, time, and situation  Grooming: Disheveled and Hospital garb  Psychomotor Behavior: reluctant to participate, psychomotor agitation, restless and fidgety , eye contact minimal  Speech: normal tone, normal rate, normal volume, rapid  Language: grossly intact, able to name, able to repeat  Mood: angry, anxious, depressed, and upset  Affect: Anxious, Consistent with mood, Congruent with thought, and Labile  Thought Process: derailed  Associations: intact   Thought Content: +delusions, denies SI, and denies HI  Perceptions: +AH and +VH  Memory: Able to recall past events, Remote intact, and Recent intact  Attention:Easily distracted and Impaired to some degree  Fund of Knowledge: Aware of current events and Vocabulary appropriate   Estimate if Intelligence:  Average based on work/education history, vocabulary and mental status exam  Insight: poor awareness of illness  Judgment: impaired due to nicolás/psychosis        DIAGNOSTIC TESTING   Laboratory Results  No results found for this or any previous visit (from the past 24 hours).            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  ADHD      Polysubstance Dependence  Nicotine Dependence              PROBLEM LIST AND MANAGEMENT PLANS     Bipolar Disorder I mre mixed, severe with psychotic features: pt counseled  -pt recently received 156 mg Invega BAUER- seeking collateral to verify dosage and date of administration; pt will likely need a dose increase for next dose  -start Risperdal 0.5 mg po BID- increase to 1 mg po BID- increase to 1.5 mg po BID  -pt requested resuming depakote- start back at 500 mg po BID- check level in 4 days    -symptoms may be in part secondary to withdrawal      Unspecified Anxiety Disorder: pt counseled  -resumed/continue off-label gabapenitn 800 mg po TID- increase to 900 mg po TID  -resumed/continue off-label Klonopin 1 mg po TID (steve try to aoer down/off given addiction issues)     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: pt counseled  -restarted/continue home trazodone 50 mg po HS     ADHD: pt counseled  -hold vyvanse- likely worsening symptoms   -consider trial of Atomoextine     Polysubstance Dependence: pt counseled  -restart/continue home Suboxone 8-2 mg SL po TID- decrease to BID today (pt was refusing dosages over the last 24 hours; she requested to taper off)- will taper off as tolerated- stop (pt refusing)     Nicotine Dependence: pt counseled  -started/continue nicotine 14 mg patch dermal q day          Discussed diagnosis, risks and benefits of proposed treatment vs alternative treatments vs no treatment, potential side effects of these treatments and the inherent unpredictability of treatment. The patient expresses understanding of the above and displays the capacity to agree with this treatment given said understanding. Patient also agrees that, currently, the benefits outweigh the risks and would like to pursue/continue treatment at this time.    Any medications being used off-label were discussed with the patient inclusive of the evidence base for the use of the medications and consent was obtained for the off-label use of the medication.       DISCHARGE PLANNING  Expected Disposition Plan: Home or Self Care      NEED FOR CONTINUED HOSPITALIZATION  Psychiatric illness continues to pose a potential threat to life or bodily function, of self or others, thereby requiring the need for continued inpatient psychiatric hospitalization: Yes, due to: significant psychotic thought disorder, aggressive neuroleptic titration, hallucinations, aggressive behavior, and gravely disabled, as evidenced by:  Aggressed against Staff., Ongoing concerns with Active  HI/Threatening behavior., Ongoing concerns with grave disability with patient unable to perform basic feeding, hygiene and dressing activities without significant constant support., and Ongoing concerns with perceptual aberrancy and paranoid persecutory delusions leading to potential harm of self or others.    Protective inpatient pyschiatric hospitalization required while a safe disposition plan is enacted: Yes    Patient stabilized and ready for discharge from inpatient psychiatric unit: No        STAFF:   Brandyn Looney MD  Psychiatry

## 2024-12-14 PROCEDURE — 25000003 PHARM REV CODE 250: Performed by: PSYCHIATRY & NEUROLOGY

## 2024-12-14 PROCEDURE — 99233 SBSQ HOSP IP/OBS HIGH 50: CPT | Mod: AF,HB,, | Performed by: STUDENT IN AN ORGANIZED HEALTH CARE EDUCATION/TRAINING PROGRAM

## 2024-12-14 PROCEDURE — 25000003 PHARM REV CODE 250: Performed by: INTERNAL MEDICINE

## 2024-12-14 PROCEDURE — 11400000 HC PSYCH PRIVATE ROOM

## 2024-12-14 RX ADMIN — RISPERIDONE 1.5 MG: 1 TABLET, FILM COATED ORAL at 08:12

## 2024-12-14 RX ADMIN — CLONAZEPAM 1 MG: 1 TABLET ORAL at 02:12

## 2024-12-14 RX ADMIN — CETIRIZINE HYDROCHLORIDE 10 MG: 5 TABLET ORAL at 08:12

## 2024-12-14 RX ADMIN — DIVALPROEX SODIUM 500 MG: 250 TABLET, DELAYED RELEASE ORAL at 08:12

## 2024-12-14 RX ADMIN — PROPRANOLOL HYDROCHLORIDE 20 MG: 20 TABLET ORAL at 08:12

## 2024-12-14 RX ADMIN — TRAZODONE HYDROCHLORIDE 50 MG: 50 TABLET ORAL at 08:12

## 2024-12-14 RX ADMIN — GABAPENTIN 900 MG: 300 CAPSULE ORAL at 08:12

## 2024-12-14 RX ADMIN — GABAPENTIN 900 MG: 300 CAPSULE ORAL at 02:12

## 2024-12-14 RX ADMIN — CLONAZEPAM 1 MG: 1 TABLET ORAL at 08:12

## 2024-12-14 RX ADMIN — ACETAMINOPHEN 650 MG: 325 TABLET ORAL at 03:12

## 2024-12-14 NOTE — NURSING
"Patient took a shower this evening, remained in the common area, quiet, interacting with peers and staff ad elvia, cooperative with no negative behaviors noted. Patient endorses depression stating " I had a bad day, it's personal", denies anxiety, denies S/HI, denies A/VH. No signs of agitation noted, did not verbalize request for discharging this evening. Patient accepted HS snack, and compliant with HS medication, and went to her room and remains asleep at this time. Close observations continued and safe environment maintained.  "
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
## 2024-12-14 NOTE — PLAN OF CARE
Problem: Adult Behavioral Health Plan of Care  Goal: Adheres to Safety Considerations for Self and Others  Outcome: Progressing     Problem: Adult Behavioral Health Plan of Care  Goal: Plan of Care Review  Outcome: Progressing     Problem: Psychotic Signs/Symptoms  Goal: Improved Behavioral Control (Psychotic Signs/Symptoms)  Outcome: Progressing     Problem: Psychotic Signs/Symptoms  Goal: Increased Participation and Engagement (Psychotic Signs/Symptoms)  Outcome: Progressing     Problem: Psychotic Signs/Symptoms  Goal: Improved Mood Symptoms (Psychotic Signs/Symptoms)  Outcome: Progressing

## 2024-12-14 NOTE — PLAN OF CARE
Problem: Adult Behavioral Health Plan of Care  Goal: Plan of Care Review  Outcome: Progressing     Problem: Adult Behavioral Health Plan of Care  Goal: Patient-Specific Goal (Individualization)  Outcome: Progressing     Problem: Adult Behavioral Health Plan of Care  Goal: Adheres to Safety Considerations for Self and Others  Outcome: Progressing     Problem: Adult Behavioral Health Plan of Care  Goal: Optimized Coping Skills in Response to Life Stressors  Outcome: Progressing     Problem: Psychotic Signs/Symptoms  Goal: Improved Behavioral Control (Psychotic Signs/Symptoms)  Outcome: Progressing     Problem: Psychotic Signs/Symptoms  Goal: Increased Participation and Engagement (Psychotic Signs/Symptoms)  Outcome: Progressing     Problem: Psychotic Signs/Symptoms  Goal: Improved Mood Symptoms (Psychotic Signs/Symptoms)  Outcome: Progressing     Problem: Psychotic Signs/Symptoms  Goal: Improved Sleep (Psychotic Signs/Symptoms)  Outcome: Progressing     Problem: Violence Risk or Actual  Goal: Anger and Impulse Control  Outcome: Progressing     Problem: Anxiety Signs/Symptoms  Goal: Optimized Energy Level (Anxiety Signs/Symptoms)  Outcome: Progressing

## 2024-12-14 NOTE — NURSING
Patient c/o headache, received prn medication as ordered, see emar. Patient states some relief upon follow up. No distress noted. Patient continues to walk up and down the hallway.

## 2024-12-15 PROCEDURE — 11400000 HC PSYCH PRIVATE ROOM

## 2024-12-15 PROCEDURE — 25000003 PHARM REV CODE 250: Performed by: INTERNAL MEDICINE

## 2024-12-15 PROCEDURE — 25000003 PHARM REV CODE 250: Performed by: PSYCHIATRY & NEUROLOGY

## 2024-12-15 PROCEDURE — 99232 SBSQ HOSP IP/OBS MODERATE 35: CPT | Mod: AF,HB,, | Performed by: STUDENT IN AN ORGANIZED HEALTH CARE EDUCATION/TRAINING PROGRAM

## 2024-12-15 RX ADMIN — GABAPENTIN 900 MG: 300 CAPSULE ORAL at 08:12

## 2024-12-15 RX ADMIN — CETIRIZINE HYDROCHLORIDE 10 MG: 5 TABLET ORAL at 08:12

## 2024-12-15 RX ADMIN — GABAPENTIN 900 MG: 300 CAPSULE ORAL at 03:12

## 2024-12-15 RX ADMIN — CLONAZEPAM 1 MG: 1 TABLET ORAL at 08:12

## 2024-12-15 RX ADMIN — PROPRANOLOL HYDROCHLORIDE 20 MG: 20 TABLET ORAL at 08:12

## 2024-12-15 RX ADMIN — ACETAMINOPHEN 650 MG: 325 TABLET ORAL at 01:12

## 2024-12-15 RX ADMIN — RISPERIDONE 1.5 MG: 1 TABLET, FILM COATED ORAL at 08:12

## 2024-12-15 RX ADMIN — CLONAZEPAM 1 MG: 1 TABLET ORAL at 03:12

## 2024-12-15 RX ADMIN — ONDANSETRON 4 MG: 4 TABLET, ORALLY DISINTEGRATING ORAL at 04:12

## 2024-12-15 RX ADMIN — TRAZODONE HYDROCHLORIDE 50 MG: 50 TABLET ORAL at 08:12

## 2024-12-15 RX ADMIN — DIVALPROEX SODIUM 500 MG: 250 TABLET, DELAYED RELEASE ORAL at 08:12

## 2024-12-15 NOTE — PLAN OF CARE
Problem: Adult Behavioral Health Plan of Care  Goal: Plan of Care Review  Outcome: Progressing     Problem: Psychotic Signs/Symptoms  Goal: Improved Behavioral Control (Psychotic Signs/Symptoms)  Outcome: Progressing     Problem: Psychotic Signs/Symptoms  Goal: Increased Participation and Engagement (Psychotic Signs/Symptoms)  Outcome: Progressing     Problem: Psychotic Signs/Symptoms  Goal: Improved Mood Symptoms (Psychotic Signs/Symptoms)  Outcome: Progressing

## 2024-12-15 NOTE — PLAN OF CARE
Pt. Remained in her room most of this shift. She denied any c/o auditory or visual hallucinations. Pt. Also denied any c/o suicidal or homicidal ideations. She denied feeling anxious at this time. Pt. Took her night medications without diff. She ate her snack and returned to bed. Encouraged Pt. To verbalize her feelings. Will cont. To monitor Pt.
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
## 2024-12-15 NOTE — NURSING
Plan of care reviewed. Pt has spent most of the morning in her bedroom.  Has been out more in the common areas this afternoon.  Pt states her anxiety is better today.  Pt states her meds are working for her.  Pt states she hopes to go home in the next couple of days because she misses her kids. Pt denies si, hi or a v hallucinations today.  Gravely disabled.  Pt taking meds without side effects noted.  Appetite adequate.  Pt instructed to call for any needs or concerns at all. Verbalized understanding.  Will cont to monitor for safety.  Patient care ongoing.

## 2024-12-15 NOTE — NURSING
Plan of care reviewed. Pt has been back and forth from her bedroom to the common areas.  Pt interacting with select peers and staff without difficulty.  Pt continues to cry off and on.  Pt states she is crying because she has been here 7 days and is ready to be discharged home.  Pt states she will talk to the doctor to see if she can leave as soon as possible. Pt states she is ready to see her kids.  Pt is no longer on her suboxone as she stated she did not need it on yesterday.  Pt continues to deny any withdrawal symptoms. Pt continues to be labile but better today.  Pt easier to redirect.  Pt taking meds without side effects noted.  Appetite adequate.  No behavioral outbursts noted.  Pt instructed to call for any needs or concerns at all.  Verbalized understanding. Will cont to monitor for safety.  Patient care ongoing.

## 2024-12-15 NOTE — NURSING
Patient c/o headache, received prn medication as ordered, see emar. Patient voices relief upon follow up. Patient continues to pace hallway. No distress noted.

## 2024-12-16 LAB — VALPROATE SERPL-MCNC: 89 UG/ML (ref 50–100)

## 2024-12-16 PROCEDURE — 90833 PSYTX W PT W E/M 30 MIN: CPT | Mod: SA,HB,, | Performed by: PSYCHIATRY & NEUROLOGY

## 2024-12-16 PROCEDURE — 25000003 PHARM REV CODE 250: Performed by: INTERNAL MEDICINE

## 2024-12-16 PROCEDURE — 25000003 PHARM REV CODE 250: Performed by: PSYCHIATRY & NEUROLOGY

## 2024-12-16 PROCEDURE — 80164 ASSAY DIPROPYLACETIC ACD TOT: CPT | Performed by: PSYCHIATRY & NEUROLOGY

## 2024-12-16 PROCEDURE — 11400000 HC PSYCH PRIVATE ROOM

## 2024-12-16 PROCEDURE — 99232 SBSQ HOSP IP/OBS MODERATE 35: CPT | Mod: SA,HB,, | Performed by: PSYCHIATRY & NEUROLOGY

## 2024-12-16 PROCEDURE — 63600175 PHARM REV CODE 636 W HCPCS: Performed by: PSYCHIATRY & NEUROLOGY

## 2024-12-16 PROCEDURE — 36415 COLL VENOUS BLD VENIPUNCTURE: CPT | Performed by: PSYCHIATRY & NEUROLOGY

## 2024-12-16 RX ORDER — BUPRENORPHINE AND NALOXONE 4; 1 MG/1; MG/1
1 FILM, SOLUBLE BUCCAL; SUBLINGUAL DAILY
Status: DISCONTINUED | OUTPATIENT
Start: 2024-12-16 | End: 2024-12-18 | Stop reason: HOSPADM

## 2024-12-16 RX ADMIN — PROPRANOLOL HYDROCHLORIDE 20 MG: 20 TABLET ORAL at 08:12

## 2024-12-16 RX ADMIN — GABAPENTIN 900 MG: 300 CAPSULE ORAL at 08:12

## 2024-12-16 RX ADMIN — DIVALPROEX SODIUM 500 MG: 250 TABLET, DELAYED RELEASE ORAL at 08:12

## 2024-12-16 RX ADMIN — RISPERIDONE 1.5 MG: 1 TABLET, FILM COATED ORAL at 08:12

## 2024-12-16 RX ADMIN — BUPRENORPHINE AND NALOXONE 1 FILM: 4; 1 FILM BUCCAL; SUBLINGUAL at 11:12

## 2024-12-16 RX ADMIN — GABAPENTIN 900 MG: 300 CAPSULE ORAL at 03:12

## 2024-12-16 RX ADMIN — TRAZODONE HYDROCHLORIDE 50 MG: 50 TABLET ORAL at 08:12

## 2024-12-16 RX ADMIN — CETIRIZINE HYDROCHLORIDE 10 MG: 5 TABLET ORAL at 08:12

## 2024-12-16 RX ADMIN — CLONAZEPAM 1 MG: 1 TABLET ORAL at 08:12

## 2024-12-16 RX ADMIN — ACETAMINOPHEN 650 MG: 325 TABLET ORAL at 07:12

## 2024-12-16 RX ADMIN — CLONAZEPAM 1 MG: 1 TABLET ORAL at 03:12
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
## 2024-12-16 NOTE — PLAN OF CARE
Problem: Adult Behavioral Health Plan of Care  Goal: Optimized Coping Skills in Response to Life Stressors  Intervention: Promote Effective Coping Strategies  Flowsheets (Taken 12/16/2024 2109)  Supportive Measures:   active listening utilized   self-responsibility promoted   verbalization of feelings encouraged   self-reflection promoted   journaling promoted         Behavior: alert, oriented            Intervention: In this CBT-focused group SW facilitated discussion on patients learning about stress in their lives. Each patient was asked to identify the negative effects of stress and identify emotions associated with stress. Each patient was asked to identify their social support system. Each patient was given a handout on Stress Management Tips.              Response: Pt participated in group discussion and completed worksheet. Pt was able to identify the stressors in her life and ways to overcome them.          Plan: Continue to encourage pt to participate in process groups to verbalize feelings and develop healthy coping skills.

## 2024-12-16 NOTE — PLAN OF CARE
Problem: Adult Behavioral Health Plan of Care  Goal: Plan of Care Review  Outcome: Progressing     Problem: Adult Behavioral Health Plan of Care  Goal: Adheres to Safety Considerations for Self and Others  Outcome: Progressing     Problem: Adult Behavioral Health Plan of Care  Goal: Optimized Coping Skills in Response to Life Stressors  Outcome: Progressing     Problem: Psychotic Signs/Symptoms  Goal: Improved Behavioral Control (Psychotic Signs/Symptoms)  Outcome: Progressing     Problem: Psychotic Signs/Symptoms  Goal: Improved Sleep (Psychotic Signs/Symptoms)  Outcome: Progressing     Problem: Anxiety Signs/Symptoms  Goal: Optimized Energy Level (Anxiety Signs/Symptoms)  Outcome: Progressing

## 2024-12-16 NOTE — PLAN OF CARE
"Pt. Noticed to be withdrawn to her room. She starting yelling aloud stating 'she putting pillow cases over my children heads" No one noticed in Pt's room. Pt. Noticed to be crying and c/o feeling scared. Assured Pt. That no one was in the room with her. It took some time but Pt. Eventually calmed down. She denied any c/o  suicidal or homicidal ideations. She took her night medications without diff. Pt. Was later noticed to be sleeping with resp. Even and unlabored. Will cont. To monitor Pt.  "

## 2024-12-17 PROCEDURE — 25000003 PHARM REV CODE 250: Performed by: INTERNAL MEDICINE

## 2024-12-17 PROCEDURE — 63600175 PHARM REV CODE 636 W HCPCS: Performed by: PSYCHIATRY & NEUROLOGY

## 2024-12-17 PROCEDURE — 11400000 HC PSYCH PRIVATE ROOM

## 2024-12-17 PROCEDURE — 90833 PSYTX W PT W E/M 30 MIN: CPT | Mod: SA,HB,, | Performed by: PSYCHIATRY & NEUROLOGY

## 2024-12-17 PROCEDURE — 99232 SBSQ HOSP IP/OBS MODERATE 35: CPT | Mod: SA,HB,, | Performed by: PSYCHIATRY & NEUROLOGY

## 2024-12-17 PROCEDURE — 25000003 PHARM REV CODE 250: Performed by: PSYCHIATRY & NEUROLOGY

## 2024-12-17 RX ORDER — ELECTROLYTES/DEXTROSE
SOLUTION, ORAL ORAL 2 TIMES DAILY
Status: DISCONTINUED | OUTPATIENT
Start: 2024-12-17 | End: 2024-12-18 | Stop reason: HOSPADM

## 2024-12-17 RX ADMIN — HYDROCORTISONE: 0.01 CREAM TOPICAL at 08:12

## 2024-12-17 RX ADMIN — GABAPENTIN 900 MG: 300 CAPSULE ORAL at 08:12

## 2024-12-17 RX ADMIN — CLONAZEPAM 1 MG: 1 TABLET ORAL at 02:12

## 2024-12-17 RX ADMIN — RISPERIDONE 1.5 MG: 1 TABLET, FILM COATED ORAL at 08:12

## 2024-12-17 RX ADMIN — GABAPENTIN 900 MG: 300 CAPSULE ORAL at 02:12

## 2024-12-17 RX ADMIN — BUPRENORPHINE AND NALOXONE 1 FILM: 4; 1 FILM BUCCAL; SUBLINGUAL at 08:12

## 2024-12-17 RX ADMIN — DIVALPROEX SODIUM 500 MG: 250 TABLET, DELAYED RELEASE ORAL at 08:12

## 2024-12-17 RX ADMIN — CLONAZEPAM 1 MG: 1 TABLET ORAL at 08:12

## 2024-12-17 RX ADMIN — CETIRIZINE HYDROCHLORIDE 10 MG: 5 TABLET ORAL at 08:12

## 2024-12-17 RX ADMIN — PROPRANOLOL HYDROCHLORIDE 20 MG: 20 TABLET ORAL at 08:12

## 2024-12-17 NOTE — PLAN OF CARE
POC reviewed this shift and is ongoing.  Pt compliant with meds with no adverse reaction. Pt has no ss of distress. Cooperated with staff.
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
## 2024-12-17 NOTE — NURSING
POC reviewed this shift and is ongoing.  Pt is cooperative with care and complaint with medications.  Pt denies SI/HI/AVH.  Pt spent much of her day isolated in her room, out only for meals, snacks, medications.  No peer interactions noted.  Pt voices readiness for discharge tomorrow.

## 2024-12-17 NOTE — PLAN OF CARE
Problem: Adult Behavioral Health Plan of Care  Goal: Plan of Care Review  Outcome: Progressing  Goal: Patient-Specific Goal (Individualization)  Outcome: Progressing  Goal: Adheres to Safety Considerations for Self and Others  Outcome: Progressing  Goal: Absence of New-Onset Illness or Injury  Outcome: Progressing  Goal: Optimized Coping Skills in Response to Life Stressors  Outcome: Progressing     Problem: Psychotic Signs/Symptoms  Goal: Improved Behavioral Control (Psychotic Signs/Symptoms)  Outcome: Progressing  Goal: Optimal Cognitive Function (Psychotic Signs/Symptoms)  Outcome: Progressing  Goal: Increased Participation and Engagement (Psychotic Signs/Symptoms)  Outcome: Progressing  Goal: Improved Mood Symptoms (Psychotic Signs/Symptoms)  Outcome: Progressing  Goal: Improved Psychomotor Symptoms (Psychotic Signs/Symptoms)  Outcome: Progressing  Goal: Decreased Sensory Symptoms (Psychotic Signs/Symptoms)  Outcome: Progressing  Goal: Improved Sleep (Psychotic Signs/Symptoms)  Outcome: Progressing  Goal: Enhanced Social, Occupational or Functional Skills (Psychotic Signs/Symptoms)  Outcome: Progressing     Problem: Violence Risk or Actual  Goal: Anger and Impulse Control  Outcome: Progressing     Problem: Anxiety Signs/Symptoms  Goal: Optimized Energy Level (Anxiety Signs/Symptoms)  Outcome: Progressing  Goal: Optimized Cognitive Function (Anxiety Signs/Symptoms)  Outcome: Progressing  Goal: Improved Mood Symptoms (Anxiety Signs/Symptoms)  Outcome: Progressing  Goal: Improved Sleep (Anxiety Signs/Symptoms)  Outcome: Progressing  Goal: Enhanced Social, Occupational or Functional Skills (Anxiety Signs/Symptoms)  Outcome: Progressing  Goal: Improved Somatic Symptoms (Anxiety Signs/Symptoms)  Outcome: Progressing     Problem: Manic or Hypomanic Signs/Symptoms  Goal: Improved Impulse Control (Manic/Hypomanic Signs/Symptoms)  Outcome: Progressing  Goal: Optimized Cognitive Function (Manic/Hypomanic  Signs/Symptoms)  Outcome: Progressing  Goal: Improved Mood Symptoms (Manic/Hypomanic Signs/Symptoms)  Outcome: Progressing  Goal: Optimized Nutrition Intake (Manic or Hypomanic Signs/Symptoms)  Outcome: Progressing  Goal: Improved Psychomotor Symptoms (Manic/Hypomanic Signs/Symptoms)  Outcome: Progressing  Goal: Improved Sleep (Manic/Hypomanic Signs/Symptoms)  Outcome: Progressing  Goal: Enhanced Social, Occupational or Functional Skills (Manic/Hypomanic Signs/Symptoms)  Outcome: Progressing     Problem: Suicide Risk  Goal: Absence of Self-Harm  Outcome: Progressing

## 2024-12-17 NOTE — NURSING
Patient spending more time out of her room , took shower this am, anxious, tearful at times, cooperative with no negative behaviors noted. Patient ambulated to the nurse station noted with hand tremors and requesting to be put back on Suboxone. Patient visited with Lalo Rea NP with order for Suboxone 4-1 SL daily (first dose administered), and Valproic Acid Level today at 3 PM. LAB to unit and lab drawn as ordered. Close observations continued and safe environment maintained.

## 2024-12-18 VITALS
RESPIRATION RATE: 20 BRPM | SYSTOLIC BLOOD PRESSURE: 158 MMHG | OXYGEN SATURATION: 96 % | DIASTOLIC BLOOD PRESSURE: 65 MMHG | HEIGHT: 63 IN | BODY MASS INDEX: 27.29 KG/M2 | TEMPERATURE: 98 F | WEIGHT: 154 LBS | HEART RATE: 79 BPM

## 2024-12-18 PROCEDURE — 25000003 PHARM REV CODE 250: Performed by: INTERNAL MEDICINE

## 2024-12-18 PROCEDURE — 99239 HOSP IP/OBS DSCHRG MGMT >30: CPT | Mod: AF,HB,, | Performed by: STUDENT IN AN ORGANIZED HEALTH CARE EDUCATION/TRAINING PROGRAM

## 2024-12-18 PROCEDURE — 90833 PSYTX W PT W E/M 30 MIN: CPT | Mod: AF,HB,, | Performed by: STUDENT IN AN ORGANIZED HEALTH CARE EDUCATION/TRAINING PROGRAM

## 2024-12-18 PROCEDURE — 25000003 PHARM REV CODE 250: Performed by: PSYCHIATRY & NEUROLOGY

## 2024-12-18 RX ORDER — DIVALPROEX SODIUM 500 MG/1
500 TABLET, DELAYED RELEASE ORAL 2 TIMES DAILY
Qty: 60 TABLET | Refills: 0 | Status: SHIPPED | OUTPATIENT
Start: 2024-12-18 | End: 2025-12-18

## 2024-12-18 RX ORDER — GABAPENTIN 300 MG/1
900 CAPSULE ORAL 3 TIMES DAILY
Qty: 270 CAPSULE | Refills: 0 | Status: SHIPPED | OUTPATIENT
Start: 2024-12-18 | End: 2025-12-18

## 2024-12-18 RX ORDER — PROPRANOLOL HYDROCHLORIDE 20 MG/1
20 TABLET ORAL 2 TIMES DAILY
Qty: 60 TABLET | Refills: 0 | Status: SHIPPED | OUTPATIENT
Start: 2024-12-18 | End: 2025-12-18

## 2024-12-18 RX ORDER — RISPERIDONE 0.5 MG/1
1.5 TABLET ORAL 2 TIMES DAILY
Qty: 180 TABLET | Refills: 0 | Status: SHIPPED | OUTPATIENT
Start: 2024-12-18 | End: 2025-12-18

## 2024-12-18 RX ORDER — CLONAZEPAM 1 MG/1
1 TABLET ORAL 3 TIMES DAILY
Qty: 6 TABLET | Refills: 0 | Status: SHIPPED | OUTPATIENT
Start: 2024-12-18 | End: 2025-12-18

## 2024-12-18 RX ADMIN — CLONAZEPAM 1 MG: 1 TABLET ORAL at 08:12

## 2024-12-18 RX ADMIN — CETIRIZINE HYDROCHLORIDE 10 MG: 5 TABLET ORAL at 08:12

## 2024-12-18 RX ADMIN — CLONAZEPAM 1 MG: 1 TABLET ORAL at 02:12

## 2024-12-18 RX ADMIN — HYDROCORTISONE: 0.01 CREAM TOPICAL at 08:12

## 2024-12-18 RX ADMIN — GABAPENTIN 900 MG: 300 CAPSULE ORAL at 08:12

## 2024-12-18 RX ADMIN — RISPERIDONE 1.5 MG: 1 TABLET, FILM COATED ORAL at 08:12

## 2024-12-18 RX ADMIN — DIVALPROEX SODIUM 500 MG: 250 TABLET, DELAYED RELEASE ORAL at 08:12

## 2024-12-18 RX ADMIN — GABAPENTIN 900 MG: 300 CAPSULE ORAL at 02:12

## 2024-12-18 RX ADMIN — PROPRANOLOL HYDROCHLORIDE 20 MG: 20 TABLET ORAL at 08:12

## 2024-12-18 NOTE — DISCHARGE SUMMARY
"Discharge Summary  Psychiatry    Admit Date: 12/10/2024    Discharge Date and Time:  12/18/2024 11:36 AM    Attending Physician: Brandyn Looney MD     Discharge Provider: Tino Wei III    Reason for Admission:  CHIEF COMPLAINT   Katelynn Crabtree is a 34 y.o. female with a past psychiatric history of Bipolar, ADHD, OCD, and PTSD currently admitted to the inpatient unit with the following chief complaint: psychosis/paranoia, "They had these people try to kil lmy kids."    HPI   The patient was seen and examined. The chart was reviewed.     The patient presented to the ER on 12/10/2024 . Per staff notes:  - Pt arrived via ems, pt chief complaint is a Psychiatric evaluation. Pt screaming triage saying she is going to die, pt displaying manic behavior with scratches all over body.    Katelynn Crabtree is a 34 y.o. female, with a PMHx of ADHD, bipolar disorder, depression, nicolás, schizoaffective disorder, who presents to the ED for psychiatric evaluation. Independent historian: EMS reports they were called to the scene by patient's  for patient exhibiting dyspnea. When they arrived the patient was outside the building in current state stating "I do not want to die". EMS reports she walked into ambulance without assistance. Patient has visible self inflicted abrasions to skin. No other exacerbating or alleviating factors. Patient denies illicit drug usage. There are no other complaints at this time. Patient reports known allergy to hydroxyzine.   -Pt extremely anxious once upon stretcher. Crying and screaming about staff trying to kill her with hepatitis c.   -Pt arrived tearful and paranoid but was cooperative with admission.  Pt is anxious and fearful, because she believes that people are trying to kill her, her son and 'everyone'.  Pt denies smoking, alcohol use and illegal drug use.  UDS was negative.  During admission interview pt was labile and hyper verbal and very difficult to redirect to " "questions.  Pt kept repeating that she believes people are trying to kill her.  Pt denies SI/HI/AVH, but appeared RIS during interview.   Pt arrived at the ED via ambulance.  EMS reported that they were called by patient's .  Upon arrival, pt was frantic stating, "I do not want to die".  Pt had self-inflicted scratches to her face  -Pt initially agreed, but was paranoid and agitated      The patient was medically cleared and admitted to the [facility].     The patient was a poor historian. She presents with overt mixed mood symptoms and psychosis. She often yelled at a friend in the room "who keeps looking and laughing at me." She was a poor and inconsistent historian.   "I need to tell you, I am prescribed Vyvanse, klonopin, gabapentin and something else."     She reports a h/o of severe polysubstance dependence; she denied any recent use, but she is on numerous controlled substances. She reports not taking klonopin or suboxone in about 1 week (possible withdrawals)       Procedures Performed: * No surgery found *    Hospital Course:    Patient was admitted to the inpatient psychiatry unit after being medically cleared in the ED. Chart and labs were reviewed. The patient was stabilized as follows:        Bipolar Disorder I mre mixed, severe with psychotic features: pt counseled  -pt recently received 156 mg Invega BAUER- seeking collateral to verify dosage and date of administration; pt will likely need a dose increase for next dose  -start Risperdal 0.5 mg po BID- increase to 1 mg po BID- increase to 1.5 mg po BID- Continue  -pt requested resuming depakote- start back at 500 mg po BID- check level in 4 days-Check level today- Level 89, continue current dose     -symptoms may be in part secondary to withdrawal     Unspecified Anxiety Disorder: pt counseled  -resumed/continue off-label gabapenitn 800 mg po TID- increase to 900 mg po TID  -resumed/continue off-label Klonopin 1 mg po TID (steve try to taper down/off " given addiction issues)     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      Nicotine Dependence: pt counseled  -started/continue nicotine 14 mg patch dermal q day           The patient reports improved symptoms as documented. The patient is requesting discharge.The patient is hopeful, future oriented and goal directed. The patient readily discusses both short and long term goals. The patient can identify positive coping skills and social support. AIMS score was 0. During hospitalization, the patient was encouraged to go to both groups and individual counseling. Patient was monitored for any side effects. A meeting was held with multidisciplinary team prior to discharge and pt's diagnosis, current medications, and follow up were discussed. The patient has been compliant with treatment and can adequately attend to activities of daily living in an independent manner. The patient denies any side effects. The patient denies SI, HI, plan or intent for self harm or harm to others. The patient is no longer a danger to self or others nor gravely disabled disabled. Patient discharged  in stable condition with scheduled outpatient follow up.      Discussed diagnosis, risks and benefits of proposed treatment vs alternative treatments vs no treatment, and potential side effects of these treatments.  The patient expresses understanding of the above and displays the capacity to agree with this treatment given said understanding.  Patient also agrees that, currently, the benefits outweigh the risks  and would like to pursue treatment at this time.      Discharge MSE: stated age, casually dressed, well groomed.  No psychomotor agitation or retardation.  No abnormal involuntary movements.  Gait normal.  Speech normal, conversational.  Language fluent English. Mood fine.  Affect normal range, pleasant, euthymic.  Thought process linear.  Associations intact.  Denies suicidal or homicidal ideation.  Denies auditory hallucinations, paranoid ideation, ideas of reference.  Memory intact.  Attention intact.  Fund of knowledge intact.  Insight intact.  Judgment intact.  Alert and oriented to person, place, time.      Tobacco Usage:  Is patient a smoker? Yes  Does patient want prescription for Tobacco Cessation? No  Does patient want counseling for Tobacco Cessation? No    If patient would like to quit, then over the counter nicotine patch could be used. The patient could also follow up with his PCP or psychiatric provider for other alternatives.     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  ADHD      Polysubstance Dependence  Nicotine Dependence       Labs:  Admission on 12/10/2024   Component Date Value Ref Range Status    Sodium 12/11/2024 141  136 - 145 mmol/L Final    Potassium 12/11/2024 4.3  3.5 - 5.1 mmol/L Final    Chloride 12/11/2024 106  95 - 110 mmol/L Final    CO2 12/11/2024 28  23 - 29 mmol/L Final    Glucose 12/11/2024 90  70 - 110 mg/dL Final    BUN 12/11/2024 11  6 - 20 mg/dL Final    Creatinine 12/11/2024 1.0  0.5 - 1.4 mg/dL Final    Calcium 12/11/2024 9.1  8.7 - 10.5 mg/dL Final    Total Protein 12/11/2024 6.7  6.0 - 8.4 g/dL Final    Albumin 12/11/2024 3.3 (L)  3.5 - 5.2 g/dL Final    Total Bilirubin 12/11/2024 0.3  0.1 - 1.0 mg/dL Final    Alkaline Phosphatase 12/11/2024 68  55 - 135 U/L Final    AST 12/11/2024 33  10 - 40 U/L Final    ALT  12/11/2024 29  10 - 44 U/L Final    eGFR 12/11/2024 >60.0  >60 mL/min/1.73 m^2 Final    Anion Gap 12/11/2024 7  3 - 11 mmol/L Final    Hemoglobin A1C 12/11/2024 5.2  4.0 - 5.6 % Final    Estimated Avg Glucose 12/11/2024 103  68 - 131 mg/dL Final    Cholesterol 12/11/2024 133  120 - 199 mg/dL Final    Triglycerides 12/11/2024 81  30 - 150 mg/dL Final    HDL 12/11/2024 51  40 - 75 mg/dL Final    LDL Cholesterol 12/11/2024 65.8  63.0 - 159.0 mg/dL Final    HDL/Cholesterol Ratio 12/11/2024 38.3  20.0 - 50.0 % Final    Total Cholesterol/HDL Ratio 12/11/2024 2.6  2.0 - 5.0 Final    Non-HDL Cholesterol 12/11/2024 82  mg/dL Final    Valproic Acid Level 12/16/2024 89  50 - 100 ug/mL Final   Admission on 12/09/2024, Discharged on 12/09/2024   Component Date Value Ref Range Status    WBC 12/09/2024 9.59  3.90 - 12.70 K/uL Final    RBC 12/09/2024 4.13  4.00 - 5.40 M/uL Final    Hemoglobin 12/09/2024 13.3  12.0 - 16.0 g/dL Final    Hematocrit 12/09/2024 37.7  37.0 - 48.5 % Final    MCV 12/09/2024 91  82 - 98 fL Final    MCH 12/09/2024 32.2 (H)  27.0 - 31.0 pg Final    MCHC 12/09/2024 35.3  32.0 - 36.0 g/dL Final    RDW 12/09/2024 11.1 (L)  11.5 - 14.5 % Final    Platelets 12/09/2024 258  150 - 450 K/uL Final    MPV 12/09/2024 9.3  9.2 - 12.9 fL Final    Immature Granulocytes 12/09/2024 0.3  0.0 - 0.5 % Final    Gran # (ANC) 12/09/2024 6.2  1.8 - 7.7 K/uL Final    Immature Grans (Abs) 12/09/2024 0.03  0.00 - 0.04 K/uL Final    Lymph # 12/09/2024 2.4  1.0 - 4.8 K/uL Final    Mono # 12/09/2024 1.0  0.3 - 1.0 K/uL Final    Eos # 12/09/2024 0.0  0.0 - 0.5 K/uL Final    Baso # 12/09/2024 0.04  0.00 - 0.20 K/uL Final    nRBC 12/09/2024 0  0 /100 WBC Final    Gran % 12/09/2024 64.5  38.0 - 73.0 % Final    Lymph % 12/09/2024 24.5  18.0 - 48.0 % Final    Mono % 12/09/2024 10.3  4.0 - 15.0 % Final    Eosinophil % 12/09/2024 0.0  0.0 - 8.0 % Final    Basophil % 12/09/2024 0.4  0.0 - 1.9 % Final    Differential Method 12/09/2024 Automated    Final    Sodium 12/09/2024 134 (L)  136 - 145 mmol/L Final    Potassium 12/09/2024 3.3 (L)  3.5 - 5.1 mmol/L Final    Chloride 12/09/2024 98  95 - 110 mmol/L Final    CO2 12/09/2024 20 (L)  23 - 29 mmol/L Final    Glucose 12/09/2024 103  70 - 110 mg/dL Final    BUN 12/09/2024 2 (L)  6 - 20 mg/dL Final    Creatinine 12/09/2024 0.9  0.5 - 1.4 mg/dL Final    Calcium 12/09/2024 9.8  8.7 - 10.5 mg/dL Final    Total Protein 12/09/2024 7.6  6.0 - 8.4 g/dL Final    Albumin 12/09/2024 4.2  3.5 - 5.2 g/dL Final    Total Bilirubin 12/09/2024 0.3  0.1 - 1.0 mg/dL Final    Alkaline Phosphatase 12/09/2024 74  40 - 150 U/L Final    AST 12/09/2024 39  10 - 40 U/L Final    ALT 12/09/2024 29  10 - 44 U/L Final    eGFR 12/09/2024 >60  >60 mL/min/1.73 m^2 Final    Anion Gap 12/09/2024 16  8 - 16 mmol/L Final    TSH 12/09/2024 1.089  0.400 - 4.000 uIU/mL Final    Specimen UA 12/09/2024 Urine, Clean Catch   Final    Color, UA 12/09/2024 Yellow  Yellow, Straw, Kaley Final    Appearance, UA 12/09/2024 Hazy (A)  Clear Final    pH, UA 12/09/2024 6.0  5.0 - 8.0 Final    Specific Gravity, UA 12/09/2024 <1.005 (A)  1.005 - 1.030 Final    Protein, UA 12/09/2024 Negative  Negative Final    Glucose, UA 12/09/2024 Negative  Negative Final    Ketones, UA 12/09/2024 Negative  Negative Final    Bilirubin (UA) 12/09/2024 Negative  Negative Final    Occult Blood UA 12/09/2024 Negative  Negative Final    Nitrite, UA 12/09/2024 Negative  Negative Final    Urobilinogen, UA 12/09/2024 Negative  <2.0 EU/dL Final    Leukocytes, UA 12/09/2024 Trace (A)  Negative Final    Benzodiazepines 12/09/2024 Negative  Negative Final    Methadone metabolites 12/09/2024 Negative  Negative Final    Cocaine (Metab.) 12/09/2024 Negative  Negative Final    Opiate Scrn, Ur 12/09/2024 Negative  Negative Final    Barbiturate Screen, Ur 12/09/2024 Negative  Negative Final    Amphetamine Screen, Ur 12/09/2024 Negative  Negative Final    THC 12/09/2024 Negative  Negative Final     Phencyclidine 12/09/2024 Negative  Negative Final    Creatinine, Urine 12/09/2024 41.7  15.0 - 325.0 mg/dL Final    Toxicology Information 12/09/2024 SEE COMMENT   Final    Alcohol, Serum 12/09/2024 <10  <10 mg/dL Final    Acetaminophen (Tylenol), Serum 12/09/2024 <3.0 (L)  10.0 - 20.0 ug/mL Final    QRS Duration 12/09/2024 74  ms Final    [facility] QTC Calculation 12/09/2024 466  ms Final    HCG Quant 12/09/2024 <1.2  See Text mIU/mL Final    RBC, UA 12/09/2024 2  0 - 4 /hpf Final    WBC, UA 12/09/2024 4  0 - 5 /hpf Final    Bacteria 12/09/2024 Occasional  None-Occ /hpf Final    Squam Epithel, UA 12/09/2024 12  /hpf Final    Microscopic Comment 12/09/2024 SEE COMMENT   Final         Discharged Condition: stable and improved; not currently a danger to self/others or gravely disabled    Disposition: Home or Self Care    Is patient being discharged on multiple neuroleptics? No    Follow Up/Patient Instructions:     Take all medications as prescribed.  Attend all psychiatric and medical follow up appointments.   Abstain from all drugs and alcohol.  Call the crisis line at: [phone] for help in a crisis and emergent situations or call 911 and Return to ED for any acute worsening of your condition including suicidal or homicidal ideations      Discharge Procedure Orders   Diet Adult Regular     Notify your health care provider if you experience any of the following:  temperature >100.4     Notify your health care provider if you experience any of the following:  persistent nausea and vomiting or diarrhea     Notify your health care provider if you experience any of the following:   Order Comments: Suicidal thoughts, homicidal thoughts, or any other changes in mental status  If you would like immediate help/crisis counseling, please call [phone] (TALK). Through this toll-free phone number for a network of crisis centers across the country. These centers staff their lines with people who are trained to listen and  offer support to people in emotional crisis. If you are in an emergency, please call 911.     Notify your health care provider if you experience any of the following:  increased confusion or weakness     Notify your health care provider if you experience any of the following:  persistent dizziness, light-headedness, or visual disturbances     Activity as tolerated           Follow up apt: staff will schedule      Medications:  Reconciled Home Medications:      Medication List        START taking these medications      divalproex 500 MG Tbec  Commonly known as: DEPAKOTE  Take 1 tablet (500 mg total) by mouth 2 (two) times a day.     gabapentin 300 MG capsule  Commonly known as: NEURONTIN  Take 3 capsules (900 mg total) by mouth 3 (three) times daily.  Replaces: gabapentin 600 MG tablet     risperiDONE 0.5 MG Tab  Commonly known as: RISPERDAL  Take 3 tablets (1.5 mg total) by mouth 2 (two) times daily.            CHANGE how you take these medications      clonazePAM 1 MG tablet  Commonly known as: KlonoPIN  Take 1 tablet (1 mg total) by mouth 3 (three) times daily.  What changed: when to take this     propranoloL 20 MG tablet  Commonly known as: INDERAL  Take 1 tablet (20 mg total) by mouth 2 (two) times daily.  What changed:   medication strength  how much to take            CONTINUE taking these medications      cetirizine 10 MG tablet  Commonly known as: ZYRTEC  Take 1 tablet (10 mg total) by mouth once daily.            STOP taking these medications      benztropine 1 MG tablet  Commonly known as: COGENTIN     diclofenac sodium 1 % Gel  Commonly known as: VOLTAREN     gabapentin 600 MG tablet  Commonly known as: NEURONTIN  Replaced by: gabapentin 300 MG capsule     ibuprofen 600 MG tablet  Commonly known as: ADVIL,MOTRIN     lisdexamfetamine 40 MG Cap  Commonly known as: VYVANSE     paliperidone palmitate 156 mg/mL Syrg injection  Commonly known as: INVEGA SUSTENNA     traZODone 50 MG tablet  Commonly known as:  DESYREL     triamcinolone acetonide 0.1% 0.1 % cream  Commonly known as: KENALOG                Psychotherapy:  Target symptoms: mood disorder, psychosis  Why chosen therapy is appropriate versus another modality: relevant to diagnosis  Outcome monitoring methods: self-report  Therapeutic intervention type: supportive psychotherapy  Topics discussed/themes: building skills sets for symptom management, symptom recognition  The patient's response to the intervention is accepting. The patient's progress toward treatment goals is fair.   Duration of intervention: 16 minutes.      Diet: regular     Activity as tolerated    Total time spent discharging patient: 34 minutes    Tino Wei III, MD  Psychiatry

## 2024-12-18 NOTE — NURSING
AVS reviewed with patient, all questions answered.  Pt has met criteria for discharge and voices readiness.

## 2024-12-18 NOTE — NURSING
Patient refused buprenorphine-naloxone 4-1 mg SL film 1 Film  began crying saying she does not want to take the medication.

## 2024-12-18 NOTE — PLAN OF CARE
Patient is calm.  Patient expressed that the medication is causing her to sleep too much.  Would like a decrease in medication.  Cotinues to have a rash to face and neck.  No further concerns.  Slept through the night.

## 2024-12-19 NOTE — PROGRESS NOTES
Patient discharged home to self care.  Patient denied all ideations prior to leaving. Patient denied any pain or discomfort.  Patient was provided with discharge instructions.

## 2025-01-15 ENCOUNTER — HOSPITAL ENCOUNTER (EMERGENCY)
Facility: HOSPITAL | Age: 35
Discharge: HOME | End: 2025-01-16
Attending: EMERGENCY MEDICINE
Payer: MEDICAID

## 2025-01-15 DIAGNOSIS — T78.40XA ALLERGIC REACTION, INITIAL ENCOUNTER: Primary | ICD-10-CM

## 2025-01-15 PROCEDURE — 25000003 PHARM REV CODE 250: Performed by: EMERGENCY MEDICINE

## 2025-01-15 PROCEDURE — 96375 TX/PRO/DX INJ NEW DRUG ADDON: CPT

## 2025-01-15 PROCEDURE — 96374 THER/PROPH/DIAG INJ IV PUSH: CPT

## 2025-01-15 PROCEDURE — 63600175 PHARM REV CODE 636 W HCPCS: Performed by: EMERGENCY MEDICINE

## 2025-01-15 PROCEDURE — 99284 EMERGENCY DEPT VISIT MOD MDM: CPT | Mod: 25

## 2025-01-15 RX ORDER — FAMOTIDINE 10 MG/ML
20 INJECTION INTRAVENOUS 2 TIMES DAILY
Status: DISCONTINUED | OUTPATIENT
Start: 2025-01-15 | End: 2025-01-16 | Stop reason: HOSPADM

## 2025-01-15 RX ORDER — LORAZEPAM 2 MG/ML
1 INJECTION INTRAMUSCULAR
Status: COMPLETED | OUTPATIENT
Start: 2025-01-15 | End: 2025-01-15

## 2025-01-15 RX ORDER — ACETAMINOPHEN 325 MG/1
650 TABLET ORAL
Status: COMPLETED | OUTPATIENT
Start: 2025-01-15 | End: 2025-01-15

## 2025-01-15 RX ADMIN — ACETAMINOPHEN 650 MG: 325 TABLET ORAL at 11:01

## 2025-01-15 RX ADMIN — LORAZEPAM 1 MG: 2 INJECTION INTRAMUSCULAR; INTRAVENOUS at 10:01

## 2025-01-15 RX ADMIN — FAMOTIDINE 20 MG: 10 INJECTION, SOLUTION INTRAVENOUS at 10:01

## 2025-01-16 VITALS
TEMPERATURE: 98 F | RESPIRATION RATE: 20 BRPM | HEART RATE: 86 BPM | DIASTOLIC BLOOD PRESSURE: 55 MMHG | SYSTOLIC BLOOD PRESSURE: 106 MMHG | OXYGEN SATURATION: 97 %

## 2025-01-16 RX ORDER — PREDNISONE 50 MG/1
50 TABLET ORAL DAILY PRN
Qty: 3 TABLET | Refills: 0 | Status: SHIPPED | OUTPATIENT
Start: 2025-01-17 | End: 2025-01-20

## 2025-01-16 RX ORDER — EPINEPHRINE 0.3 MG/.3ML
1 INJECTION SUBCUTANEOUS ONCE AS NEEDED
Qty: 0.3 ML | Refills: 0 | Status: SHIPPED | OUTPATIENT
Start: 2025-01-16 | End: 2025-01-16

## 2025-01-16 RX ORDER — FAMOTIDINE 20 MG/1
20 TABLET, FILM COATED ORAL EVERY 8 HOURS PRN
Qty: 14 TABLET | Refills: 0 | Status: SHIPPED | OUTPATIENT
Start: 2025-01-16

## 2025-01-16 NOTE — ED PROVIDER NOTES
Encounter Date: 1/15/2025       History     Chief Complaint   Patient presents with    Allergic Reaction     From perimeter for facial and tongue swelling, full body hives. Was given 25 PO benadryl around 1500 without relief. Pt states is also allergic to benadryl and made s/s worse.      34-year-old female brought to the emergency department by EMS for evaluation of allergic reaction.  Patient states she was having a mild reaction at the psychiatric facility.  They gave her some Benadryl for the symptoms even though she told them she was allergic to it.  She had significant worsening of her symptoms including facial swelling.  There was a reported tongue swelling.  There is no report of patient having difficulty swallowing.  She was given IM epinephrine and IV Solu-Medrol EN route by EMS with significant improvement in symptoms.  Reports some persistent swelling and itching but states symptoms have significantly improved.  Denies any chest pain, shortness breath, vomiting, diarrhea.      Review of patient's allergies indicates:   Allergen Reactions    Benadryl allergy decongestant Swelling    Hydroxyzine Swelling     Pt says her throat swells up     Past Medical History:   Diagnosis Date    Attention deficit hyperactivity disorder (ADHD) 2023    Bipolar disorder     Depression     History of psychiatric hospitalization     Hx of psychiatric care     Marsha     Psychiatric exam requested by authority     Psychiatric problem     Psychosis     Schizoaffective disorder     Therapy      Past Surgical History:   Procedure Laterality Date     SECTION       Family History   Problem Relation Name Age of Onset    Alcohol abuse Mother      Bipolar disorder Mother      Alcohol abuse Maternal Aunt       Social History     Tobacco Use    Smoking status: Every Day     Current packs/day: 1.00     Average packs/day: 1 pack/day for 26.3 years (26.3 ttl pk-yrs)     Types: Vaping with nicotine, Cigarettes     Start date:  9/12/1998    Smokeless tobacco: Never    Tobacco comments:     on the hazards of smoking   Substance Use Topics    Alcohol use: Not Currently     Comment: sober for 9 months    Drug use: Not Currently     Review of Systems   Constitutional:  Negative for chills and fever.   HENT:  Negative for congestion.    Respiratory:  Negative for cough and shortness of breath.    Cardiovascular:  Negative for chest pain.   Gastrointestinal:  Negative for abdominal pain.   Musculoskeletal:  Negative for back pain.   Neurological:  Negative for headaches.       Physical Exam     Initial Vitals   BP Pulse Resp Temp SpO2   01/15/25 2228 01/15/25 2228 01/15/25 2228 01/16/25 0101 01/15/25 2228   (!) 142/89 87 (!) 22 98.1 °F (36.7 °C) 98 %      MAP       --                Physical Exam    Nursing note and vitals reviewed.  Constitutional: She appears well-developed and well-nourished. No distress.   HENT:   Head: Atraumatic.   Mild swelling to lips, no oropharyngeal swelling, mild edema to face   Neck: Neck supple. No tracheal deviation present.   Normal range of motion.  Cardiovascular:  Normal rate and intact distal pulses.           Pulmonary/Chest: No respiratory distress.   Musculoskeletal:         General: No tenderness or edema. Normal range of motion.      Cervical back: Normal range of motion and neck supple.     Neurological: She is alert and oriented to person, place, and time. She has normal strength. No cranial nerve deficit. GCS score is 15. GCS eye subscore is 4. GCS verbal subscore is 5. GCS motor subscore is 6.   Skin: Skin is warm and dry.   Limited patches of urticaria to upper and lower extremities and trunk         ED Course   Procedures  Labs Reviewed - No data to display       Imaging Results    None          Medications   famotidine (PF) injection 20 mg (20 mg Intravenous Given 1/15/25 2241)   LORazepam injection 1 mg (1 mg Intravenous Given 1/15/25 2240)   acetaminophen tablet 650 mg (650 mg Oral Given 1/15/25  5269)     Medical Decision Making  34-year-old female brought to the emergency department by EMS for evaluation of allergic reaction      Differential: Urticaria, allergic reaction, angioedema, anaphylaxis      Patient given some Pepcid and Tylenol here.  Her symptoms have continued to improve and she is feeling significantly better.  Given a little of Ativan for itching.  Vital signs stable.  Informed of results and plan to discharge with prescriptions for prednisone p.r.n., Pepcid, EpiPen.  Instructed on home management, follow up with primary care physician and/or allergist/immunologist.    Patient medically clear for transfer back to psychiatric facility.    Problems Addressed:  Allergic reaction, initial encounter: acute illness or injury with systemic symptoms    Amount and/or Complexity of Data Reviewed  Independent Historian: EMS     Details: Discussed with EMS regarding patient's presentation, medications given EN route and effect of medication  External Data Reviewed: notes.     Details: Reviewed most recent PCP note documenting baseline medications and past medical history    Risk  OTC drugs.  Prescription drug management.  Parenteral controlled substances.                                      Clinical Impression:  Final diagnoses:  [T78.40XA] Allergic reaction, initial encounter (Primary)          ED Disposition Condition    Discharge Stable          ED Prescriptions       Medication Sig Dispense Start Date End Date Auth. Provider    predniSONE (DELTASONE) 50 MG Tab Take 1 tablet (50 mg total) by mouth daily as needed (Allergic reaction symptoms). 3 tablet 1/17/2025 1/20/2025 Arian Christie MD    famotidine (PEPCID) 20 MG tablet Take 1 tablet (20 mg total) by mouth every 8 (eight) hours as needed (Allergic reaction symptoms). 14 tablet 1/16/2025 -- Arian Christie MD    EPINEPHrine (EPIPEN) 0.3 mg/0.3 mL AtIn (Expires today) Inject 0.3 mLs (0.3 mg total) into the muscle once as needed  (Allergic reaction). 0.3 mL 1/16/2025 1/16/2025 Arian Christie MD          Follow-up Information       Follow up With Specialties Details Why Contact Info    Tonya Reeves MD Family Medicine Schedule an appointment as soon as possible for a visit   [address]  [facility] [address]  [phone]               Arian Christie MD  01/16/25 0121       Arian Christie MD  01/16/25 0121

## 2025-01-16 NOTE — ED NOTES
Pt flow center called and in formed that [facility] is not allowing writer to put in a ADT 22 of 30. Flow center reports that they will put in order for transport manually.

## 2025-01-16 NOTE — DISCHARGE INSTRUCTIONS
I recommend giving the prescribed prednisone 50 mg once daily as needed if patient experiences any additional allergic reaction symptoms.  Patient can also received Pepcid 20 mg every 8 hours as needed for allergic reaction type symptoms.  If patient has significant swelling or shortness of breath, an EpiPen can be administered as well.

## 2025-01-16 NOTE — ED NOTES
Pt swelling in lips lots as if it is going down. Pt resting wit eyes closed. Bed locked and in the lowest position, side rails up x2, sitter at bedside.

## 2025-04-22 ENCOUNTER — HOSPITAL ENCOUNTER (EMERGENCY)
Facility: OTHER | Age: 35
Discharge: PSYCHIATRIC OR SUBSTANCE USE FACILITY | End: 2025-04-22
Attending: EMERGENCY MEDICINE
Payer: MEDICAID

## 2025-04-22 VITALS
RESPIRATION RATE: 15 BRPM | TEMPERATURE: 98 F | WEIGHT: 155 LBS | HEART RATE: 90 BPM | SYSTOLIC BLOOD PRESSURE: 123 MMHG | OXYGEN SATURATION: 95 % | DIASTOLIC BLOOD PRESSURE: 69 MMHG | BODY MASS INDEX: 27.46 KG/M2

## 2025-04-22 DIAGNOSIS — F22 DELUSIONAL DISORDER: ICD-10-CM

## 2025-04-22 DIAGNOSIS — N30.00 ACUTE CYSTITIS WITHOUT HEMATURIA: ICD-10-CM

## 2025-04-22 DIAGNOSIS — F41.9 ANXIETY: Primary | ICD-10-CM

## 2025-04-22 DIAGNOSIS — R44.1 VISUAL HALLUCINATIONS: ICD-10-CM

## 2025-04-22 LAB
ABSOLUTE EOSINOPHIL (OHS): 0.04 K/UL
ABSOLUTE MONOCYTE (OHS): 0.39 K/UL (ref 0.3–1)
ABSOLUTE NEUTROPHIL COUNT (OHS): 4.95 K/UL (ref 1.8–7.7)
ALBUMIN SERPL BCP-MCNC: 4.1 G/DL (ref 3.5–5.2)
ALP SERPL-CCNC: 67 UNIT/L (ref 40–150)
ALT SERPL W/O P-5'-P-CCNC: 16 UNIT/L (ref 10–44)
AMPHET UR QL SCN: NEGATIVE
ANION GAP (OHS): 11 MMOL/L (ref 8–16)
APAP SERPL-MCNC: <3 UG/ML (ref 10–20)
AST SERPL-CCNC: 20 UNIT/L (ref 11–45)
B-HCG UR QL: NEGATIVE
BACTERIA #/AREA URNS AUTO: ABNORMAL /HPF
BARBITURATE SCN PRESENT UR: NEGATIVE
BASOPHILS # BLD AUTO: 0.04 K/UL
BASOPHILS NFR BLD AUTO: 0.5 %
BENZODIAZ UR QL SCN: NEGATIVE
BILIRUB SERPL-MCNC: 0.6 MG/DL (ref 0.1–1)
BILIRUB UR QL STRIP.AUTO: NEGATIVE
BUN SERPL-MCNC: 9 MG/DL (ref 6–20)
CALCIUM SERPL-MCNC: 9.2 MG/DL (ref 8.7–10.5)
CANNABINOIDS UR QL SCN: NEGATIVE
CHLORIDE SERPL-SCNC: 109 MMOL/L (ref 95–110)
CLARITY UR: ABNORMAL
CO2 SERPL-SCNC: 19 MMOL/L (ref 23–29)
COCAINE UR QL SCN: NEGATIVE
COLOR UR AUTO: YELLOW
CREAT SERPL-MCNC: 0.7 MG/DL (ref 0.5–1.4)
CREAT UR-MCNC: 149.2 MG/DL (ref 15–325)
CTP QC/QA: YES
ERYTHROCYTE [DISTWIDTH] IN BLOOD BY AUTOMATED COUNT: 11.5 % (ref 11.5–14.5)
ETHANOL SERPL-MCNC: <10 MG/DL
GFR SERPLBLD CREATININE-BSD FMLA CKD-EPI: >60 ML/MIN/1.73/M2
GLUCOSE SERPL-MCNC: 98 MG/DL (ref 70–110)
GLUCOSE UR QL STRIP: NEGATIVE
HCT VFR BLD AUTO: 36.4 % (ref 37–48.5)
HCV AB SERPL QL IA: POSITIVE
HGB BLD-MCNC: 12.7 GM/DL (ref 12–16)
HGB UR QL STRIP: ABNORMAL
HIV 1+2 AB+HIV1 P24 AG SERPL QL IA: NEGATIVE
HOLD SPECIMEN: NORMAL
HOLD SPECIMEN: NORMAL
HYALINE CASTS UR QL AUTO: 0 /LPF (ref 0–1)
IMM GRANULOCYTES # BLD AUTO: 0.03 K/UL (ref 0–0.04)
IMM GRANULOCYTES NFR BLD AUTO: 0.4 % (ref 0–0.5)
KETONES UR QL STRIP: ABNORMAL
LEUKOCYTE ESTERASE UR QL STRIP: ABNORMAL
LYMPHOCYTES # BLD AUTO: 1.9 K/UL (ref 1–4.8)
MCH RBC QN AUTO: 32.6 PG (ref 27–31)
MCHC RBC AUTO-ENTMCNC: 34.9 G/DL (ref 32–36)
MCV RBC AUTO: 94 FL (ref 82–98)
METHADONE UR QL SCN: NEGATIVE
MICROSCOPIC COMMENT: ABNORMAL
NITRITE UR QL STRIP: POSITIVE
NUCLEATED RBC (/100WBC) (OHS): 0 /100 WBC
OPIATES UR QL SCN: NEGATIVE
PCP UR QL: NEGATIVE
PH UR STRIP: 6 [PH]
PLATELET # BLD AUTO: 234 K/UL (ref 150–450)
PMV BLD AUTO: 9.6 FL (ref 9.2–12.9)
POTASSIUM SERPL-SCNC: 4.1 MMOL/L (ref 3.5–5.1)
PROT SERPL-MCNC: 7.2 GM/DL (ref 6–8.4)
PROT UR QL STRIP: NEGATIVE
RBC # BLD AUTO: 3.89 M/UL (ref 4–5.4)
RBC #/AREA URNS AUTO: 5 /HPF (ref 0–4)
RELATIVE EOSINOPHIL (OHS): 0.5 %
RELATIVE LYMPHOCYTE (OHS): 25.9 % (ref 18–48)
RELATIVE MONOCYTE (OHS): 5.3 % (ref 4–15)
RELATIVE NEUTROPHIL (OHS): 67.4 % (ref 38–73)
SODIUM SERPL-SCNC: 139 MMOL/L (ref 136–145)
SP GR UR STRIP: 1.02
SQUAMOUS #/AREA URNS AUTO: 30 /HPF
UROBILINOGEN UR STRIP-ACNC: NEGATIVE EU/DL
WBC # BLD AUTO: 7.35 K/UL (ref 3.9–12.7)
WBC #/AREA URNS AUTO: 11 /HPF (ref 0–5)

## 2025-04-22 PROCEDURE — 85025 COMPLETE CBC W/AUTO DIFF WBC: CPT | Performed by: NURSE PRACTITIONER

## 2025-04-22 PROCEDURE — 96365 THER/PROPH/DIAG IV INF INIT: CPT

## 2025-04-22 PROCEDURE — 87088 URINE BACTERIA CULTURE: CPT | Performed by: NURSE PRACTITIONER

## 2025-04-22 PROCEDURE — 81001 URINALYSIS AUTO W/SCOPE: CPT | Performed by: NURSE PRACTITIONER

## 2025-04-22 PROCEDURE — 99285 EMERGENCY DEPT VISIT HI MDM: CPT | Mod: 25

## 2025-04-22 PROCEDURE — 80143 DRUG ASSAY ACETAMINOPHEN: CPT | Performed by: NURSE PRACTITIONER

## 2025-04-22 PROCEDURE — 81025 URINE PREGNANCY TEST: CPT | Performed by: NURSE PRACTITIONER

## 2025-04-22 PROCEDURE — 82077 ASSAY SPEC XCP UR&BREATH IA: CPT | Performed by: NURSE PRACTITIONER

## 2025-04-22 PROCEDURE — 63600175 PHARM REV CODE 636 W HCPCS: Performed by: NURSE PRACTITIONER

## 2025-04-22 PROCEDURE — 86803 HEPATITIS C AB TEST: CPT | Performed by: EMERGENCY MEDICINE

## 2025-04-22 PROCEDURE — 25000003 PHARM REV CODE 250: Performed by: NURSE PRACTITIONER

## 2025-04-22 PROCEDURE — 80307 DRUG TEST PRSMV CHEM ANLYZR: CPT | Performed by: NURSE PRACTITIONER

## 2025-04-22 PROCEDURE — 87389 HIV-1 AG W/HIV-1&-2 AB AG IA: CPT | Performed by: EMERGENCY MEDICINE

## 2025-04-22 PROCEDURE — 82040 ASSAY OF SERUM ALBUMIN: CPT | Performed by: NURSE PRACTITIONER

## 2025-04-22 RX ORDER — CLONAZEPAM 0.5 MG/1
1 TABLET ORAL
Status: COMPLETED | OUTPATIENT
Start: 2025-04-22 | End: 2025-04-22

## 2025-04-22 RX ADMIN — CLONAZEPAM 1 MG: 0.5 TABLET ORAL at 08:04

## 2025-04-22 RX ADMIN — GENTAMICIN SULFATE 298 MG: 40 INJECTION, SOLUTION INTRAMUSCULAR; INTRAVENOUS at 10:04

## 2025-04-22 NOTE — ED NOTES
Per ED Provider, NICK Suero, NP, pt placed under PEC at this time. All potentially harmful objects removed from room. ED sitterMarion, at bedside for observations.

## 2025-04-22 NOTE — ED NOTES
Valuables   Gold-colored necklace  Silver colored anklet  Rosary necklace   Insurance card    Belongings   Gray socks   Black shirt   White bra  Black shorts  Black sandals

## 2025-04-22 NOTE — ED NOTES
Pt transported out of department on stretcher by [facility]. Security distributed pt belongings to [facility] personnel. Psych facility notified.

## 2025-04-22 NOTE — ED PROVIDER NOTES
"Encounter Date: 2025    SCRIBE #1 NOTE: I, Juli Bertha, am scribing for, and in the presence of,  JOSE Montaño.       History     Chief Complaint   Patient presents with    Anxiety     Pt [facility] reporting increased anxiety xFew days d/t being out of her Klonopin and ADHD medications. Pt tearful and tremulous in triage, states "I feel like I'm going to die." Pt denies SI/HI.     34 y.o. female with a PMHx of ADHD, bipolar disorder, depression, nicolás, psychosis, and schizoaffective presents to the ED for evaluation of increased anxiety. She states that she ran out of her Klonopin 1 week ago and her psychiatrist is currently out of the country. Pt reports tremors secondary to feeling anxious. She denies any fever. Pt reports that she feels like she is dying and someone will kill her, her , and her kids. She states that "bad spirits are trying to get into her". Pt currently takes Vyvanse and gabapentin. She is allergic to Benadryl and Hydroxyzine.         The history is provided by the patient. No  was used.     Review of patient's allergies indicates:   Allergen Reactions    Benadryl allergy decongestant Swelling    Hydroxyzine Swelling     Pt says her throat swells up     Past Medical History:   Diagnosis Date    Attention deficit hyperactivity disorder (ADHD) 2023    Bipolar disorder     Depression     History of psychiatric hospitalization     Hx of psychiatric care     Nicolás     Psychiatric exam requested by authority     Psychiatric problem     Psychosis     Schizoaffective disorder     Therapy      Past Surgical History:   Procedure Laterality Date     SECTION       Family History   Problem Relation Name Age of Onset    Alcohol abuse Mother      Bipolar disorder Mother      Alcohol abuse Maternal Aunt       Social History[1]  Review of Systems   Constitutional:  Negative for fever.   HENT:  Negative for sore throat.    Respiratory:  Negative for shortness of " breath.    Cardiovascular:  Negative for chest pain.   Gastrointestinal:  Negative for nausea.   Genitourinary:  Negative for dysuria.   Musculoskeletal:  Negative for back pain.   Skin:  Negative for rash.   Neurological:  Positive for tremors. Negative for weakness.   Hematological:  Does not bruise/bleed easily.   Psychiatric/Behavioral:  Positive for hallucinations. Negative for suicidal ideas. The patient is nervous/anxious.    All other systems reviewed and are negative.      Physical Exam     Initial Vitals [04/22/25 0802]   BP Pulse Resp Temp SpO2   (!) 113/54 101 18 97.5 °F (36.4 °C) 96 %      MAP       --         Physical Exam    Nursing note and vitals reviewed.  Constitutional: She appears well-developed and well-nourished. She is not diaphoretic. No distress.   HENT:   Head: Normocephalic and atraumatic. Mouth/Throat: Oropharynx is clear and moist.   Eyes: Conjunctivae and EOM are normal. Pupils are equal, round, and reactive to light.   Neck: Neck supple.   Normal range of motion.  Cardiovascular:  Normal rate, regular rhythm, normal heart sounds and intact distal pulses.     Exam reveals no gallop and no friction rub.       No murmur heard.  Pulmonary/Chest: Breath sounds normal. No respiratory distress. She has no wheezes. She has no rhonchi. She has no rales. She exhibits no tenderness.   Abdominal: Abdomen is soft. Bowel sounds are normal. She exhibits no distension and no mass. There is no abdominal tenderness. There is no rebound and no guarding.   Musculoskeletal:         General: No tenderness or edema. Normal range of motion.      Cervical back: Normal range of motion and neck supple.     Neurological: She is alert and oriented to person, place, and time. She has normal strength. GCS score is 15. GCS eye subscore is 4. GCS verbal subscore is 5. GCS motor subscore is 6.   Skin: Skin is warm and dry. Capillary refill takes less than 2 seconds. No rash noted. No erythema.   Psychiatric: Her mood  "appears anxious. She is actively hallucinating (visual). Thought content is paranoid and delusional ("the spirits are trying to put dirty things in my mouth and I can't get them to leave").   Tearful         ED Course   Procedures  Labs Reviewed   COMPREHENSIVE METABOLIC PANEL - Abnormal       Result Value    Sodium 139      Potassium 4.1      Chloride 109      CO2 19 (*)     Glucose 98      BUN 9      Creatinine 0.7      Calcium 9.2      Protein Total 7.2      Albumin 4.1      Bilirubin Total 0.6      ALP 67      AST 20      ALT 16      Anion Gap 11      eGFR >60     URINALYSIS, REFLEX TO URINE CULTURE - Abnormal    Color, UA Yellow      Appearance, UA Cloudy (*)     pH, UA 6.0      Spec Grav UA 1.020      Protein, UA Negative      Glucose, UA Negative      Ketones, UA Trace (*)     Bilirubin, UA Negative      Blood, UA Trace (*)     Nitrites, UA Positive (*)     Urobilinogen, UA Negative      Leukocyte Esterase, UA 2+ (*)    ACETAMINOPHEN LEVEL - Abnormal    Acetaminophen Level <3.0 (*)    CBC WITH DIFFERENTIAL - Abnormal    WBC 7.35      RBC 3.89 (*)     HGB 12.7      HCT 36.4 (*)     MCV 94      MCH 32.6 (*)     MCHC 34.9      RDW 11.5      Platelet Count 234      MPV 9.6      Nucleated RBC 0      Neut % 67.4      Lymph % 25.9      Mono % 5.3      Eos % 0.5      Basophil % 0.5      Imm Grans % 0.4      Neut # 4.95      Lymph # 1.90      Mono # 0.39      Eos # 0.04      Baso # 0.04      Imm Grans # 0.03     HEPATITIS C ANTIBODY - Abnormal    Hep C Ab Interp Positive (*)    URINALYSIS MICROSCOPIC - Abnormal    RBC, UA 5 (*)     WBC, UA 11 (*)     Bacteria, UA Many (*)     Squamous Epithelial Cells, UA 30      Hyaline Casts, UA 0      Microscopic Comment       DRUG SCREEN PANEL, URINE EMERGENCY - Normal    Benzodiazepine, Urine Negative      Methadone, Urine Negative      Cocaine, Urine Negative      Opiates, Urine Negative      Barbiturates, Urine Negative      Amphetamines, Urine Negative      THC Negative      " "Phencyclidine, Urine Negative      Urine Creatinine 149.2      Narrative:     This screen includes the following classes of drugs at the listed cut-off:     Benzodiazepines:        200 ng/ml   Methadone:              300 ng/ml   Cocaine metabolite:     300 ng/ml   Opiates:                300 ng/ml   Barbiturates:           200 ng/ml   Amphetamines:           1000 ng/ml   Marijuana metabs (THC): 50 ng/ml   Phencyclidine (PCP):    25 ng/ml     This is a screening test. If results do not correlate with clinical presentation, then a confirmatory send out test is advised.    This report is intended for use in clinical monitoring and management of patients. It is not intended for use in employment related drug testing."   ALCOHOL,MEDICAL (ETHANOL) - Normal    Alcohol, Serum <10     HIV 1 / 2 ANTIBODY - Normal    HIV 1/2 Ag/Ab Negative     CULTURE, URINE   CBC W/ AUTO DIFFERENTIAL    Narrative:     The following orders were created for panel order CBC auto differential.  Procedure                               Abnormality         Status                     ---------                               -----------         ------                     CBC with Differential[[id number]]       Abnormal            Final result                 Please view results for these tests on the individual orders.   EXTRA TUBES    Narrative:     The following orders were created for panel order EXTRA TUBES.  Procedure                               Abnormality         Status                     ---------                               -----------         ------                     Light Green Top Hold[[id number]]                            Final result                 Please view results for these tests on the individual orders.   LIGHT GREEN TOP HOLD    Extra Tube Hold for add-ons.     GREY TOP URINE HOLD    Extra Tube Hold for add-ons.     POCT URINE PREGNANCY    POC Preg Test, Ur Negative       Acceptable Yes            Imaging " Results    None          Medications   clonazePAM tablet 1 mg (1 mg Oral Given 4/22/25 0873)   gentamicin (GARAMYCIN) 298 mg in 0.9% NaCl 100 mL IVPB (0 mg Intravenous Stopped 4/22/25 1122)     Medical Decision Making  33 y/o female with delusional thought process and visual hallucinations. Pt states she ran out of her risperidone and Depakote a month ago. She ran out of her Klonopin 1 week ago which is concerning taking the medication inappropriately as she should still have a day left of Klonopin. Pt is having visual hallucinations and has been PEC'd. She is aware of this and is very cooperative. She was given Klonopin while in the ED and felt better but is still have delusional thoughts with concerns that she is seeing spirits. Urinalysis revealed an UTI and she was given one  and done gentamicin. Pt has been medically cleared for psych placement.    Pt accepted at [facility] and will be transported for treatment.     Differential Diagnosis: medication noncompliance, psychosis, hallucinations, schizophrenia    Problems Addressed:  Acute cystitis without hematuria: acute illness or injury  Anxiety: chronic illness or injury with exacerbation, progression, or side effects of treatment  Delusional disorder: acute illness or injury  Visual hallucinations: acute illness or injury    Amount and/or Complexity of Data Reviewed  Labs: ordered. Decision-making details documented in ED Course.    Risk  Prescription drug management.  Decision regarding hospitalization.            Scribe Attestation:   Scribe #1: I performed the above scribed service and the documentation accurately describes the services I performed. I attest to the accuracy of the note.        ED Course as of 04/22/25 1310   Tue Apr 22, 2025   0802 BP(!): 113/54 [AT]   0803 Temp: 97.5 °F (36.4 °C) [AT]   0803 Temp Source: Oral [AT]   0803 Pulse: 101 [AT]   0803 Resp: 18 [AT]   0803 SpO2: 96 % [AT]   0926 Appearance, UA(!): Cloudy [AT]   0926 NITRITE UA(!):  Positive [AT]   0954 hCG Qualitative, Urine: Negative [AT]   0954 HIV 1/2 Ag/Ab: Negative [AT]   0954 Alcohol, Serum: <10 [AT]   1119 Hepatitis C Ab(!): Positive  Hx of hepatitis and should not have been tested again [AT]      ED Course User Index  [AT] Glenis Suero FNP       Medically cleared for psychiatry placement: 4/22/2025 10:22 AM               Provider Attestation for Scribe: IGlenis FNP, reviewed documentation as scribed in my presence, which is both accurate and complete.      Clinical Impression:  Final diagnoses:  [F41.9] Anxiety (Primary)  [F22] Delusional disorder  [R44.1] Visual hallucinations  [N30.00] Acute cystitis without hematuria          ED Disposition Condition    Transfer to Psych Facility Good          ED Prescriptions    None       Follow-up Information    None          Glenis Suero FNP  04/22/25 1033         Glenis Suero FNP  04/22/25 1034       [1]   Social History  Tobacco Use    Smoking status: Every Day     Current packs/day: 1.00     Average packs/day: 1 pack/day for 26.6 years (26.6 ttl pk-yrs)     Types: Vaping with nicotine, Cigarettes     Start date: 9/12/1998    Smokeless tobacco: Never    Tobacco comments:     on the hazards of smoking   Substance Use Topics    Alcohol use: Not Currently     Comment: sober for 9 months    Drug use: Not Currently        Glenis Suero FNP  04/22/25 1310

## 2025-04-25 ENCOUNTER — RESULTS FOLLOW-UP (OUTPATIENT)
Dept: EMERGENCY MEDICINE | Facility: OTHER | Age: 35
End: 2025-04-25

## 2025-04-25 LAB — BACTERIA UR CULT: ABNORMAL

## 2025-04-30 PROBLEM — R10.11 RIGHT UPPER QUADRANT ABDOMINAL PAIN: Status: ACTIVE | Noted: 2025-04-30

## 2025-04-30 PROBLEM — K59.01 SLOW TRANSIT CONSTIPATION: Status: ACTIVE | Noted: 2025-04-30

## 2025-06-20 ENCOUNTER — OFFICE VISIT (OUTPATIENT)
Facility: CLINIC | Age: 35
End: 2025-06-20
Payer: MEDICAID

## 2025-06-20 VITALS
WEIGHT: 164 LBS | OXYGEN SATURATION: 95 % | HEART RATE: 88 BPM | HEIGHT: 63 IN | RESPIRATION RATE: 18 BRPM | TEMPERATURE: 98 F | BODY MASS INDEX: 29.06 KG/M2

## 2025-06-20 DIAGNOSIS — G24.01 TARDIVE DYSKINESIA: ICD-10-CM

## 2025-06-20 DIAGNOSIS — F25.0 SCHIZOAFFECTIVE DISORDER, BIPOLAR TYPE: ICD-10-CM

## 2025-06-20 DIAGNOSIS — F43.10 PTSD (POST-TRAUMATIC STRESS DISORDER): ICD-10-CM

## 2025-06-20 DIAGNOSIS — F19.20 POLYSUBSTANCE DEPENDENCE: Primary | ICD-10-CM

## 2025-06-20 DIAGNOSIS — F31.61 BIPOLAR 1 DISORDER, MIXED, MILD: ICD-10-CM

## 2025-06-20 PROCEDURE — 99215 OFFICE O/P EST HI 40 MIN: CPT | Mod: PBBFAC,PN | Performed by: STUDENT IN AN ORGANIZED HEALTH CARE EDUCATION/TRAINING PROGRAM

## 2025-06-20 PROCEDURE — 99999 PR PBB SHADOW E&M-EST. PATIENT-LVL V: CPT | Mod: PBBFAC,,, | Performed by: STUDENT IN AN ORGANIZED HEALTH CARE EDUCATION/TRAINING PROGRAM

## 2025-06-20 RX ORDER — BENZTROPINE MESYLATE 0.5 MG/1
1.5 TABLET ORAL 2 TIMES DAILY
Qty: 180 TABLET | Refills: 0 | Status: SHIPPED | OUTPATIENT
Start: 2025-06-20 | End: 2026-06-20

## 2025-06-20 RX ORDER — RISPERIDONE 1 MG/1
1 TABLET ORAL NIGHTLY
COMMUNITY
End: 2025-06-20

## 2025-06-20 RX ORDER — LISDEXAMFETAMINE DIMESYLATE 50 MG/1
50 CAPSULE ORAL EVERY MORNING
COMMUNITY
Start: 2025-01-25

## 2025-06-20 RX ORDER — CLONAZEPAM 0.5 MG/1
0.5 TABLET ORAL 2 TIMES DAILY PRN
Qty: 40 TABLET | Refills: 0 | Status: SHIPPED | OUTPATIENT
Start: 2025-06-20 | End: 2026-06-20

## 2025-06-20 RX ORDER — BUSPIRONE HYDROCHLORIDE 15 MG/1
15 TABLET ORAL 3 TIMES DAILY
Qty: 90 TABLET | Refills: 1 | Status: SHIPPED | OUTPATIENT
Start: 2025-06-20 | End: 2026-06-20

## 2025-06-20 RX ORDER — BUSPIRONE HYDROCHLORIDE 15 MG/1
15 TABLET ORAL 3 TIMES DAILY
COMMUNITY
End: 2025-06-20 | Stop reason: SDUPTHER

## 2025-06-20 RX ORDER — OLANZAPINE 5 MG/1
5 TABLET, FILM COATED ORAL 2 TIMES DAILY
Qty: 60 TABLET | Refills: 0 | Status: SHIPPED | OUTPATIENT
Start: 2025-06-20 | End: 2026-06-20

## 2025-06-20 RX ORDER — TRAZODONE HYDROCHLORIDE 100 MG/1
100 TABLET ORAL NIGHTLY
Qty: 30 TABLET | Refills: 0 | Status: SHIPPED | OUTPATIENT
Start: 2025-06-20 | End: 2026-06-20

## 2025-06-20 RX ORDER — PALIPERIDONE 3 MG/1
3 TABLET, EXTENDED RELEASE ORAL NIGHTLY
COMMUNITY
Start: 2025-05-06

## 2025-06-20 RX ORDER — VALBENAZINE 80 MG/1
1 CAPSULE ORAL
COMMUNITY
Start: 2025-05-30

## 2025-06-20 RX ORDER — PALIPERIDONE PALMITATE 234 MG/1.5ML
INJECTION INTRAMUSCULAR
COMMUNITY

## 2025-06-20 RX ORDER — PALIPERIDONE PALMITATE 156 MG/ML
INJECTION INTRAMUSCULAR
COMMUNITY
Start: 2025-01-17

## 2025-06-20 RX ORDER — CITALOPRAM 20 MG/1
20 TABLET ORAL DAILY
Qty: 30 TABLET | Refills: 1 | Status: SHIPPED | OUTPATIENT
Start: 2025-06-20 | End: 2026-06-20

## 2025-06-20 NOTE — PROGRESS NOTES
"SUBJECTIVE     Chief Complaint   Patient presents with    Establish Care     Pt stated she is here to establish care with a new provider so that she can continue getting her medications refill due to last provider moved       History of Present Illness    CHIEF COMPLAINT:  Patient presents today for medication refills after previous psychiatrist left the country.    CURRENT SYMPTOMS:  She reports severe tremors and shaking for approximately two months, continuous throughout the day and only absent during sleep. The tremors significantly impact her daily functioning.    ADHD:  She reports pronounced symptoms currently affecting academic performance, including difficulty maintaining focus during schoolwork, frequent distractibility, and disruptive classroom behaviors such as talking out of turn and inability to remain seated. She has not taken Vyvanse for two months and describes symptoms as "out of control," significantly interfering with academic concentration and classroom participation. She expresses desire to restart Vyvanse to help manage symptoms while attending school.    RECENT HOSPITALIZATIONS:  She reports two recent hospitalizations with subsequent medication changes upon discharge.    CURRENT MEDICATIONS:  Current medications include Invega (last dose one month ago), Cogentin, Klonopin 0.5mg, BuSpar, Trazodone at night, Celexa, and Vyvanse (not taken for two months).      ROS:  General: -fever, -chills, -fatigue, -weight gain, -weight loss  Eyes: -vision changes, -redness, -discharge  ENT: -ear pain, -nasal congestion, -sore throat  Cardiovascular: -chest pain, -palpitations, -lower extremity edema  Respiratory: -cough, -shortness of breath  Gastrointestinal: -abdominal pain, -nausea, -vomiting, -diarrhea, -constipation, -blood in stool  Genitourinary: -dysuria, -hematuria, -frequency  Musculoskeletal: -joint pain, -muscle pain, +tremors  Skin: -rash, -lesion  Neurological: -headache, -dizziness, " "-numbness, -tingling, +seizure activity  Psychiatric: +anxiety, +depression, -sleep difficulty, +nervousness, +thought or thinking problems or concerns, +inner restlessness           PAST MEDICAL HISTORY:  Past Medical History:   Diagnosis Date    Attention deficit hyperactivity disorder (ADHD) 7/4/2023    Bipolar disorder     Depression     History of psychiatric hospitalization     Hx of psychiatric care     Marsha     Psychiatric exam requested by authority     Psychiatric problem     Psychosis     Schizoaffective disorder     Therapy        ALLERGIES AND MEDICATIONS: updated and reviewed.  Review of patient's allergies indicates:   Allergen Reactions    Benadryl allergy decongestant Swelling    Hydroxyzine Swelling, Anaphylaxis and Shortness Of Breath     Pt says her throat swells up     Current Medications[1]      OBJECTIVE     Physical Exam  Vitals:    06/20/25 0824   Pulse: 88   Resp: 18   Temp: 98.2 °F (36.8 °C)    Body mass index is 29.06 kg/m².  Weight: 74.4 kg (164 lb 0.4 oz)   Height: 5' 3" (160 cm)     Physical Exam  Vitals reviewed.   Constitutional:       General: She is not in acute distress.  HENT:      Right Ear: External ear normal.      Left Ear: External ear normal.      Nose: Nose normal.      Mouth/Throat:      Mouth: Mucous membranes are moist.   Eyes:      Extraocular Movements: Extraocular movements intact.      Conjunctiva/sclera: Conjunctivae normal.      Pupils: Pupils are equal, round, and reactive to light.   Pulmonary:      Effort: Pulmonary effort is normal.   Abdominal:      General: There is no distension.      Palpations: Abdomen is soft.   Musculoskeletal:         General: No swelling. Normal range of motion.      Cervical back: Normal range of motion.   Skin:     General: Skin is warm and dry.      Findings: No rash.   Neurological:      General: No focal deficit present.      Mental Status: She is alert and oriented to person, place, and time.      Cranial Nerves: Cranial nerves " "2-12 are intact.      Motor: Tremor present.   Psychiatric:         Mood and Affect: Mood normal.         Behavior: Behavior normal.           Health Maintenance         Date Due Completion Date    Cervical Cancer Screening Never done ---    TETANUS VACCINE Never done ---    Pneumococcal Vaccines (Age 0-49) (2 of 2 - PCV) 05/21/2024 5/21/2023    Hemoglobin A1c (Diabetic Prevention Screening) 04/30/2028 4/30/2025    RSV Vaccine (Age 60+ and Pregnant patients) (1 - 1-dose 75+ series) 11/24/2063 ---              ASSESSMENT     34 y.o. female with     1. Polysubstance dependence    2. Schizoaffective disorder, bipolar type    3. PTSD (post-traumatic stress disorder)    4. Tardive dyskinesia    5. Bipolar 1 disorder, mixed, mild      F90.2 Attention-deficit hyperactivity disorder, combined type  R25.0 Abnormal head movements  R25.9 Unspecified abnormal involuntary movements    IMPRESSION:  Experiencing medication discontinuation symptoms due to previous provider leaving.  Tremors likely due to lack of medication, particularly Invega.  Potential side effects from Invega discontinuation.  Deferred prescribing Vyvanse, recommending psychiatric evaluation first.  PLAN:     1. Polysubstance dependence  - Stable, continue current regimen and refer to psych.  - Ambulatory referral/consult to Psychiatry; Future    2. Schizoaffective disorder, bipolar type  - Patient presents with severe symptoms, described as "out the roof" and "bad," including inability to sit still, difficulty focusing on schoolwork, and disruptive classroom behavior.  - Referred to psychiatry for comprehensive evaluation, medication management, and Vyvanse prescription.  - Refilled current medications: citalopram, clonazepam, trazodone, olanzapine, and buspirone.  - Patient instructed to contact Ms. Ghislaine Narayanan to schedule psychiatry appointment.  - Ambulatory referral/consult to Psychiatry; Future  - OLANZapine (ZYPREXA) 5 MG tablet; Take 1 tablet (5 mg " total) by mouth 2 (two) times a day. Do not take if feeling dizzy  Dispense: 60 tablet; Refill: 0  - citalopram (CELEXA) 20 MG tablet; Take 1 tablet (20 mg total) by mouth once daily.  Dispense: 30 tablet; Refill: 1    3. PTSD (post-traumatic stress disorder)  - Stable, continue current regimen and refer to psych.  - Ambulatory referral/consult to Psychiatry; Future  - busPIRone (BUSPAR) 15 MG tablet; Take 1 tablet (15 mg total) by mouth 3 (three) times daily.  Dispense: 90 tablet; Refill: 1  - traZODone (DESYREL) 100 MG tablet; Take 1 tablet (100 mg total) by mouth every evening.  Dispense: 30 tablet; Refill: 0  - citalopram (CELEXA) 20 MG tablet; Take 1 tablet (20 mg total) by mouth once daily.  Dispense: 30 tablet; Refill: 1    4. Tardive dyskinesia  - Patient reports severe shaking for almost 2 months that ceases during sleep.  - Assessment suggests possible side effect from starting and stopping Invega.  - Referred to neurology for comprehensive evaluation of tremors to rule out other causes.  - Refilled benztropine.  - benztropine (COGENTIN) 0.5 MG tablet; Take 3 tablets (1.5 mg total) by mouth 2 (two) times daily.  Dispense: 180 tablet; Refill: 0  - Ambulatory referral/consult to Neurology; Future    5. Bipolar 1 disorder, mixed, mild  - Stable, continue current regimen and refer to psych.  - clonazePAM (KLONOPIN) 0.5 MG tablet; Take 1 tablet (0.5 mg total) by mouth 2 (two) times daily as needed (anxiety).  Dispense: 40 tablet; Refill: 0        Tonya Reeves MD  06/20/2025 8:26 AM      F/u 3 months.    This note was generated with the assistance of ambient listening technology. Verbal consent was obtained by the patient and accompanying visitor(s) for the recording of patient appointment to facilitate this note. I attest to having reviewed and edited the generated note for accuracy, though some syntax or spelling errors may persist. Please contact the author of this note for any  clarification.                     [1]   Current Outpatient Medications   Medication Sig Dispense Refill    gabapentin (NEURONTIN) 600 MG tablet Take 1 tablet (600 mg total) by mouth 3 (three) times daily. 90 tablet 0    INGREZZA 80 mg Cap Take 1 capsule by mouth.      INVEGA SUSTENNA 156 mg/mL Syrg injection Inject into the muscle.      paliperidone (INVEGA) 3 MG TR24 Take 3 mg by mouth every evening.      VYVANSE 50 mg capsule Take 50 mg by mouth every morning.      benztropine (COGENTIN) 0.5 MG tablet Take 3 tablets (1.5 mg total) by mouth 2 (two) times daily. 180 tablet 0    busPIRone (BUSPAR) 15 MG tablet Take 1 tablet (15 mg total) by mouth 3 (three) times daily. 90 tablet 1    citalopram (CELEXA) 20 MG tablet Take 1 tablet (20 mg total) by mouth once daily. 30 tablet 1    clonazePAM (KLONOPIN) 0.5 MG tablet Take 1 tablet (0.5 mg total) by mouth 2 (two) times daily as needed (anxiety). 40 tablet 0    docusate sodium (COLACE) 100 MG capsule Take 1 capsule (100 mg total) by mouth 2 (two) times daily. (Patient not taking: Reported on 6/20/2025) 60 capsule 0    INVEGA SUSTENNA 234 mg/1.5 mL Syrg injection Inject into the muscle.      OLANZapine (ZYPREXA) 5 MG tablet Take 1 tablet (5 mg total) by mouth 2 (two) times a day. Do not take if feeling dizzy 60 tablet 0    traZODone (DESYREL) 100 MG tablet Take 1 tablet (100 mg total) by mouth every evening. 30 tablet 0     No current facility-administered medications for this visit.

## 2025-06-24 ENCOUNTER — TELEPHONE (OUTPATIENT)
Dept: NEUROLOGY | Facility: CLINIC | Age: 35
End: 2025-06-24
Payer: MEDICAID

## 2025-06-24 ENCOUNTER — PATIENT MESSAGE (OUTPATIENT)
Dept: NEUROLOGY | Facility: CLINIC | Age: 35
End: 2025-06-24
Payer: MEDICAID

## 2025-06-24 NOTE — TELEPHONE ENCOUNTER
I left voicemail for patient, Dr Solorzano will not be available for virtual appt on 6/27/25, asked for a return call for assistance with rescheduling.  My call back number ([phone]) supplied on [employer].

## 2025-07-11 ENCOUNTER — TELEPHONE (OUTPATIENT)
Facility: CLINIC | Age: 35
End: 2025-07-11
Payer: MEDICAID

## 2025-07-11 DIAGNOSIS — F25.0 SCHIZOAFFECTIVE DISORDER, BIPOLAR TYPE: ICD-10-CM

## 2025-07-11 DIAGNOSIS — F43.10 PTSD (POST-TRAUMATIC STRESS DISORDER): ICD-10-CM

## 2025-07-11 NOTE — TELEPHONE ENCOUNTER
Called pt to see if she has ever received the medication she was requesting, but her father answered and stated she was not around and that he will pass the message to her to give me a call. Voiced understanding and call was ended.

## 2025-07-13 ENCOUNTER — HOSPITAL ENCOUNTER (EMERGENCY)
Facility: OTHER | Age: 35
Discharge: PSYCHIATRIC OR SUBSTANCE USE FACILITY | End: 2025-07-13
Attending: EMERGENCY MEDICINE
Payer: MEDICAID

## 2025-07-13 VITALS
SYSTOLIC BLOOD PRESSURE: 97 MMHG | HEART RATE: 76 BPM | HEIGHT: 63 IN | DIASTOLIC BLOOD PRESSURE: 66 MMHG | WEIGHT: 150 LBS | TEMPERATURE: 98 F | RESPIRATION RATE: 16 BRPM | OXYGEN SATURATION: 98 % | BODY MASS INDEX: 26.58 KG/M2

## 2025-07-13 DIAGNOSIS — R44.0 AUDITORY HALLUCINATIONS: ICD-10-CM

## 2025-07-13 DIAGNOSIS — F23 ACUTE PSYCHOSIS: Primary | ICD-10-CM

## 2025-07-13 DIAGNOSIS — N30.00 ACUTE CYSTITIS WITHOUT HEMATURIA: ICD-10-CM

## 2025-07-13 DIAGNOSIS — F22 PARANOIA: ICD-10-CM

## 2025-07-13 LAB
ABSOLUTE EOSINOPHIL (OHS): 0.03 K/UL
ABSOLUTE MONOCYTE (OHS): 0.3 K/UL (ref 0.3–1)
ABSOLUTE NEUTROPHIL COUNT (OHS): 4.87 K/UL (ref 1.8–7.7)
ALBUMIN SERPL BCP-MCNC: 4.3 G/DL (ref 3.5–5.2)
ALP SERPL-CCNC: 63 UNIT/L (ref 40–150)
ALT SERPL W/O P-5'-P-CCNC: 21 UNIT/L (ref 10–44)
AMPHET UR QL SCN: NEGATIVE
ANION GAP (OHS): 14 MMOL/L (ref 8–16)
APAP SERPL-MCNC: <3 UG/ML (ref 10–20)
AST SERPL-CCNC: 21 UNIT/L (ref 11–45)
B-HCG UR QL: NEGATIVE
BACTERIA #/AREA URNS AUTO: ABNORMAL /HPF
BARBITURATE SCN PRESENT UR: NEGATIVE
BASOPHILS # BLD AUTO: 0.03 K/UL
BASOPHILS NFR BLD AUTO: 0.4 %
BENZODIAZ UR QL SCN: NEGATIVE
BILIRUB SERPL-MCNC: 0.7 MG/DL (ref 0.1–1)
BILIRUB UR QL STRIP.AUTO: NEGATIVE
BUN SERPL-MCNC: 13 MG/DL (ref 6–20)
CALCIUM SERPL-MCNC: 9.6 MG/DL (ref 8.7–10.5)
CANNABINOIDS UR QL SCN: NEGATIVE
CHLORIDE SERPL-SCNC: 106 MMOL/L (ref 95–110)
CLARITY UR: ABNORMAL
CO2 SERPL-SCNC: 18 MMOL/L (ref 23–29)
COCAINE UR QL SCN: NEGATIVE
COLOR UR AUTO: YELLOW
CREAT SERPL-MCNC: 1 MG/DL (ref 0.5–1.4)
CREAT UR-MCNC: 197.1 MG/DL (ref 15–325)
CTP QC/QA: YES
ERYTHROCYTE [DISTWIDTH] IN BLOOD BY AUTOMATED COUNT: 11.6 % (ref 11.5–14.5)
ETHANOL SERPL-MCNC: <10 MG/DL
GFR SERPLBLD CREATININE-BSD FMLA CKD-EPI: >60 ML/MIN/1.73/M2
GLUCOSE SERPL-MCNC: 91 MG/DL (ref 70–110)
GLUCOSE UR QL STRIP: NEGATIVE
HCT VFR BLD AUTO: 37.9 % (ref 37–48.5)
HGB BLD-MCNC: 13.1 GM/DL (ref 12–16)
HGB UR QL STRIP: NEGATIVE
HOLD SPECIMEN: 0
IMM GRANULOCYTES # BLD AUTO: 0.01 K/UL (ref 0–0.04)
IMM GRANULOCYTES NFR BLD AUTO: 0.1 % (ref 0–0.5)
KETONES UR QL STRIP: ABNORMAL
LEUKOCYTE ESTERASE UR QL STRIP: ABNORMAL
LYMPHOCYTES # BLD AUTO: 2.47 K/UL (ref 1–4.8)
MCH RBC QN AUTO: 32.2 PG (ref 27–31)
MCHC RBC AUTO-ENTMCNC: 34.6 G/DL (ref 32–36)
MCV RBC AUTO: 93 FL (ref 82–98)
METHADONE UR QL SCN: NEGATIVE
MICROSCOPIC COMMENT: ABNORMAL
NITRITE UR QL STRIP: NEGATIVE
NUCLEATED RBC (/100WBC) (OHS): 0 /100 WBC
OPIATES UR QL SCN: NEGATIVE
PCP UR QL: NEGATIVE
PH UR STRIP: 6 [PH]
PLATELET # BLD AUTO: 253 K/UL (ref 150–450)
PMV BLD AUTO: 9.5 FL (ref 9.2–12.9)
POTASSIUM SERPL-SCNC: 3.9 MMOL/L (ref 3.5–5.1)
PROT SERPL-MCNC: 7.5 GM/DL (ref 6–8.4)
PROT UR QL STRIP: NEGATIVE
RBC # BLD AUTO: 4.07 M/UL (ref 4–5.4)
RBC #/AREA URNS AUTO: 5 /HPF (ref 0–4)
RELATIVE EOSINOPHIL (OHS): 0.4 %
RELATIVE LYMPHOCYTE (OHS): 32 % (ref 18–48)
RELATIVE MONOCYTE (OHS): 3.9 % (ref 4–15)
RELATIVE NEUTROPHIL (OHS): 63.2 % (ref 38–73)
SODIUM SERPL-SCNC: 138 MMOL/L (ref 136–145)
SP GR UR STRIP: 1.02
SQUAMOUS #/AREA URNS AUTO: 9 /HPF
UROBILINOGEN UR STRIP-ACNC: NEGATIVE EU/DL
WBC # BLD AUTO: 7.71 K/UL (ref 3.9–12.7)
WBC #/AREA URNS AUTO: 26 /HPF (ref 0–5)

## 2025-07-13 PROCEDURE — 87086 URINE CULTURE/COLONY COUNT: CPT | Performed by: EMERGENCY MEDICINE

## 2025-07-13 PROCEDURE — 99285 EMERGENCY DEPT VISIT HI MDM: CPT

## 2025-07-13 PROCEDURE — 80307 DRUG TEST PRSMV CHEM ANLYZR: CPT | Performed by: EMERGENCY MEDICINE

## 2025-07-13 PROCEDURE — 25000003 PHARM REV CODE 250: Performed by: EMERGENCY MEDICINE

## 2025-07-13 PROCEDURE — 81025 URINE PREGNANCY TEST: CPT | Performed by: EMERGENCY MEDICINE

## 2025-07-13 PROCEDURE — 82077 ASSAY SPEC XCP UR&BREATH IA: CPT | Performed by: EMERGENCY MEDICINE

## 2025-07-13 PROCEDURE — 81001 URINALYSIS AUTO W/SCOPE: CPT | Performed by: EMERGENCY MEDICINE

## 2025-07-13 PROCEDURE — 85025 COMPLETE CBC W/AUTO DIFF WBC: CPT | Performed by: EMERGENCY MEDICINE

## 2025-07-13 PROCEDURE — 80143 DRUG ASSAY ACETAMINOPHEN: CPT | Performed by: EMERGENCY MEDICINE

## 2025-07-13 PROCEDURE — 80053 COMPREHEN METABOLIC PANEL: CPT | Performed by: EMERGENCY MEDICINE

## 2025-07-13 RX ORDER — TRAZODONE HYDROCHLORIDE 100 MG/1
100 TABLET ORAL NIGHTLY
Qty: 30 TABLET | Refills: 0 | Status: ON HOLD | OUTPATIENT
Start: 2025-07-13 | End: 2026-07-13

## 2025-07-13 RX ORDER — BUSPIRONE HYDROCHLORIDE 15 MG/1
15 TABLET ORAL 3 TIMES DAILY
Qty: 90 TABLET | Refills: 0 | Status: ON HOLD | OUTPATIENT
Start: 2025-07-13 | End: 2026-07-13

## 2025-07-13 RX ORDER — CITALOPRAM 20 MG/1
20 TABLET ORAL DAILY
Qty: 30 TABLET | Refills: 0 | Status: ON HOLD | OUTPATIENT
Start: 2025-07-13 | End: 2026-07-13

## 2025-07-13 RX ORDER — CLONAZEPAM 0.5 MG/1
0.5 TABLET ORAL
Status: COMPLETED | OUTPATIENT
Start: 2025-07-13 | End: 2025-07-13

## 2025-07-13 RX ORDER — CEPHALEXIN 500 MG/1
500 CAPSULE ORAL EVERY 12 HOURS
Qty: 14 CAPSULE | Refills: 0 | Status: ON HOLD | OUTPATIENT
Start: 2025-07-13 | End: 2025-07-20

## 2025-07-13 RX ADMIN — CLONAZEPAM 0.5 MG: 0.5 TABLET ORAL at 01:07

## 2025-07-13 NOTE — ED TRIAGE NOTES
Pt arrives to the ED [facility]IB [facility] w/ complaint of AH and paranoia. Pt believes people are out to get her and are going to hurt her and her children. Pt tearful and anxious, but cooperative.

## 2025-07-13 NOTE — ED PROVIDER NOTES
"Emergency Department Encounter  Provider Note    Katelynn Crabtree  [id number]  2025    Evaluation:    History Acquisition:     Chief Complaint   Patient presents with    Hallucinations     AH and paranoia for several days, BIB [facility].        History of Present Illness:  Katelynn Crabtree is a 34 y.o. female who has a past medical history of Attention deficit hyperactivity disorder (ADHD) (2023), Bipolar disorder, Depression, History of psychiatric hospitalization, psychiatric care, Marsha, Psychiatric exam requested by authority, Psychiatric problem, Psychosis, Schizoaffective disorder, and Therapy.    The patient presents to the ED due to auditory hallucinations and paranoia.  Patient states "I do not feel like myself.  I feel like people are out to get me and my kids."   She is tearful on arrival and appears to be in distress.  She states she is not sure what medications she is supposed to be on, but they have not refilled her ADHD medication or her Klonopin.  She feels as though she needs to be admitted back to a psych facility to help with her symptoms.    Additional historians utilized:  None    Prior medical records were reviewed:   PCP visit  for follow-up of schizoaffective disorder, bipolar disorder, polysubstance dependence  Admitted to psych facility 2025 for auditory hallucinations  Admitted to psych facility 2025 for bipolar disorder, psychosis    The patient's list of active medical history, family/social history, medications, and allergies as documented has been reviewed.     Past Medical History:   Diagnosis Date    Attention deficit hyperactivity disorder (ADHD) 2023    Bipolar disorder     Depression     History of psychiatric hospitalization     Hx of psychiatric care     Marsha     Psychiatric exam requested by authority     Psychiatric problem     Psychosis     Schizoaffective disorder     Therapy      Past Surgical History:   Procedure Laterality Date     SECTION   "     Family History   Problem Relation Name Age of Onset    Alcohol abuse Mother      Bipolar disorder Mother      Alcohol abuse Maternal Aunt       Social History     Socioeconomic History    Marital status: Single    Number of children: 3   Occupational History    Occupation: unemployed   Tobacco Use    Smoking status: Every Day     Current packs/day: 1.00     Average packs/day: 1 pack/day for 26.8 years (26.8 ttl pk-yrs)     Types: Vaping with nicotine, Cigarettes     Start date: 9/12/1998    Smokeless tobacco: Never    Tobacco comments:     on the hazards of smoking   Substance and Sexual Activity    Alcohol use: Not Currently     Comment: sober for 9 months    Drug use: Not Currently    Sexual activity: Not Currently     Partners: Male     Birth control/protection: Condom   Other Topics Concern    Patient feels they ought to cut down on drinking/drug use No    Patient annoyed by others criticizing their drinking/drug use No    Patient has felt bad or guilty about drinking/drug use No    Patient has had a drink/used drugs as an eye opener in the AM No     Social Drivers of Health     Financial Resource Strain: Low Risk  (4/24/2025)    Overall Financial Resource Strain (CARDIA)     Difficulty of Paying Living Expenses: Not hard at all   Food Insecurity: Food Insecurity Present (4/24/2025)    Hunger Vital Sign     Worried About Running Out of Food in the Last Year: Sometimes true     Ran Out of Food in the Last Year: Sometimes true   Transportation Needs: Unmet Transportation Needs (4/24/2025)    PRAPARE - Transportation     Lack of Transportation (Medical): Yes     Lack of Transportation (Non-Medical): Yes   Physical Activity: Sufficiently Active (4/24/2025)    Exercise Vital Sign     Days of Exercise per Week: 7 days     Minutes of Exercise per Session: 40 min   Stress: No Stress Concern Present (4/24/2025)    [employer] - Occupational Stress Questionnaire     Feeling of Stress : Not  at all   Housing Stability: High Risk (4/24/2025)    Housing Stability Vital Sign     Unable to Pay for Housing in the Last Year: No     Number of Times Moved in the Last Year: 2     Homeless in the Last Year: No       Medications:  Medication List with Changes/Refills   New Medications    CEPHALEXIN (KEFLEX) 500 MG CAPSULE    Take 1 capsule (500 mg total) by mouth every 12 (twelve) hours. for 7 days   Current Medications    BENZTROPINE (COGENTIN) 0.5 MG TABLET    Take 3 tablets (1.5 mg total) by mouth 2 (two) times daily.    BUSPIRONE (BUSPAR) 15 MG TABLET    Take 1 tablet (15 mg total) by mouth 3 (three) times daily.    CITALOPRAM (CELEXA) 20 MG TABLET    Take 1 tablet (20 mg total) by mouth once daily.    CLONAZEPAM (KLONOPIN) 0.5 MG TABLET    Take 1 tablet (0.5 mg total) by mouth 2 (two) times daily as needed (anxiety).    DOCUSATE SODIUM (COLACE) 100 MG CAPSULE    Take 1 capsule (100 mg total) by mouth 2 (two) times daily.    GABAPENTIN (NEURONTIN) 600 MG TABLET    Take 1 tablet (600 mg total) by mouth 3 (three) times daily.    INGREZZA 80 MG CAP    Take 1 capsule by mouth.    INVEGA SUSTENNA 156 MG/ML SYRG INJECTION    Inject into the muscle.    INVEGA SUSTENNA 234 MG/1.5 ML SYRG INJECTION    Inject into the muscle.    OLANZAPINE (ZYPREXA) 5 MG TABLET    Take 1 tablet (5 mg total) by mouth 2 (two) times a day. Do not take if feeling dizzy    PALIPERIDONE (INVEGA) 3 MG TR24    Take 3 mg by mouth every evening.    TRAZODONE (DESYREL) 100 MG TABLET    Take 1 tablet (100 mg total) by mouth every evening.    VYVANSE 50 MG CAPSULE    Take 50 mg by mouth every morning.       Allergies:  Review of patient's allergies indicates:   Allergen Reactions    Benadryl allergy decongestant Swelling    Hydroxyzine Swelling, Anaphylaxis and Shortness Of Breath     Pt says her throat swells up         Physical Exam:     Initial Vitals [07/13/25 1119]   BP Pulse Resp Temp SpO2   131/73 83 17 98.1 °F (36.7 °C) 97 %      MAP        --         Physical Exam    Nursing note and vitals reviewed.  Constitutional: She appears well-developed and well-nourished. She is not diaphoretic. No distress.   HENT:   Head: Normocephalic and atraumatic. Mouth/Throat: Oropharynx is clear and moist.   Eyes: EOM are normal. Pupils are equal, round, and reactive to light.   Neck: No tracheal deviation present.   Cardiovascular:  Normal rate, regular rhythm, normal heart sounds and intact distal pulses.           Pulmonary/Chest: Breath sounds normal. No stridor. No respiratory distress. She has no wheezes.   Abdominal: Abdomen is soft. Bowel sounds are normal. She exhibits no distension and no mass. There is no abdominal tenderness.   Musculoskeletal:         General: No edema. Normal range of motion.     Neurological: She is alert and oriented to person, place, and time. She has normal strength. No cranial nerve deficit or sensory deficit.   Skin: Skin is warm and dry. Capillary refill takes less than 2 seconds. No pallor.   Psychiatric: Her behavior is normal. Her mood appears anxious. Her affect is labile. She is actively hallucinating. Thought content is paranoid. She expresses no homicidal and no suicidal ideation. She expresses no suicidal plans and no homicidal plans.         Differential Diagnoses:   Based on available information and initial assessment, Differential Diagnosis includes, but is not limited to:  Decompensated psychiatric disease (schizophrenia, bipolar disorder, major depression), excited delirium, medication noncompliance, substance abuse/withdrawal, intentional drug overdose, medication toxicity, APAP/ASA overdose, acute stress reaction, personality disorder, malingering, metabolic derangement      ED Management:   Procedures    Orders Placed This Encounter    Urine culture    CBC auto differential    Comprehensive metabolic panel    Urinalysis, Reflex to Urine Culture Urine, Clean Catch    Drug screen panel, emergency    Ethanol     Acetaminophen level    CBC with Differential    EXTRA TUBES    Gold Top Hold    Gold Top Hold    Gold Top Hold    GREY TOP URINE HOLD    Urinalysis Microscopic    Vital signs while awake    Undress patient and allow them to wear facility provided apparel.    Direct Psych Observation    POCT urine pregnancy    clonazePAM tablet 0.5 mg    cephALEXin (KEFLEX) 500 MG capsule    PEC/Judicial Commitment/Legal Status/Psych Hold - Physician's Emergency Certificate/72 Hour Psych Hold          EKG:       Labs:     Labs Reviewed   COMPREHENSIVE METABOLIC PANEL - Abnormal       Result Value    Sodium 138      Potassium 3.9      Chloride 106      CO2 18 (*)     Glucose 91      BUN 13      Creatinine 1.0      Calcium 9.6      Protein Total 7.5      Albumin 4.3      Bilirubin Total 0.7      ALP 63      AST 21      ALT 21      Anion Gap 14      eGFR >60     URINALYSIS, REFLEX TO URINE CULTURE - Abnormal    Color, UA Yellow      Appearance, UA Hazy (*)     pH, UA 6.0      Spec Grav UA 1.020      Protein, UA Negative      Glucose, UA Negative      Ketones, UA Trace (*)     Bilirubin, UA Negative      Blood, UA Negative      Nitrites, UA Negative      Urobilinogen, UA Negative      Leukocyte Esterase, UA 2+ (*)    ACETAMINOPHEN LEVEL - Abnormal    Acetaminophen Level <3.0 (*)    CBC WITH DIFFERENTIAL - Abnormal    WBC 7.71      RBC 4.07      HGB 13.1      HCT 37.9      MCV 93      MCH 32.2 (*)     MCHC 34.6      RDW 11.6      Platelet Count 253      MPV 9.5      Nucleated RBC 0      Neut % 63.2      Lymph % 32.0      Mono % 3.9 (*)     Eos % 0.4      Basophil % 0.4      Imm Grans % 0.1      Neut # 4.87      Lymph # 2.47      Mono # 0.30      Eos # 0.03      Baso # 0.03      Imm Grans # 0.01     URINALYSIS MICROSCOPIC - Abnormal    RBC, UA 5 (*)     WBC, UA 26 (*)     Bacteria, UA Few (*)     Squamous Epithelial Cells, UA 9      Microscopic Comment       DRUG SCREEN PANEL, URINE EMERGENCY - Normal    Benzodiazepine, Urine Negative       "Methadone, Urine Negative      Cocaine, Urine Negative      Opiates, Urine Negative      Barbiturates, Urine Negative      Amphetamines, Urine Negative      THC Negative      Phencyclidine, Urine Negative      Urine Creatinine 197.1      Narrative:     This screen includes the following classes of drugs at the listed cut-off:     Benzodiazepines:        200 ng/ml   Methadone:              300 ng/ml   Cocaine metabolite:     300 ng/ml   Opiates:                300 ng/ml   Barbiturates:           200 ng/ml   Amphetamines:           1000 ng/ml   Marijuana metabs (THC): 50 ng/ml   Phencyclidine (PCP):    25 ng/ml     This is a screening test. If results do not correlate with clinical presentation, then a confirmatory send out test is advised.    This report is intended for use in clinical monitoring and management of patients. It is not intended for use in employment related drug testing."   ALCOHOL,MEDICAL (ETHANOL) - Normal    Alcohol, Serum <10     CULTURE, URINE   CBC W/ AUTO DIFFERENTIAL    Narrative:     The following orders were created for panel order CBC auto differential.  Procedure                               Abnormality         Status                     ---------                               -----------         ------                     CBC with Differential[[id number]]       Abnormal            Final result                 Please view results for these tests on the individual orders.   EXTRA TUBES    Narrative:     The following orders were created for panel order EXTRA TUBES.  Procedure                               Abnormality         Status                     ---------                               -----------         ------                     Gold Top Hold[[id number]]                                   Final result               Gold Top Hold[[id number]]                                   Final result               Gold Top Hold[[id number]]                                   Final result             "     Please view results for these tests on the individual orders.   GOLD TOP HOLD    Extra Tube 0     GOLD TOP HOLD    Extra Tube 0     GOLD TOP HOLD    Extra Tube 0     GREY TOP URINE HOLD   POCT URINE PREGNANCY    POC Preg Test, Ur Negative       Acceptable Yes       Independent review of the labs ordered include:   See ED course    Imaging:     Imaging Results    None            Medications Given:     Medications   clonazePAM tablet 0.5 mg (has no administration in time range)        Medical Decision Making:    Additional Consideration:   Additional testing considered during clinical course: labs/imaging considered including:  - none    Social determinants of health considered during development of treatment plan include: poor access to care    Current co-morbidities considered which impacted clinical decision making:  Bipolar disorder, ADHD, PTSD    Case discussed with additional provider: none    ED Course as of 07/13/25 1350   Sun Jul 13, 2025   1158 SpO2: 97 % [SS]   1158 Resp: 17 [SS]   1158 Pulse: 83 [SS]   1158 Temp: 98.1 °F (36.7 °C) [SS]   1158 BP: 131/73  Vitals reassuring, afebrile  [SS]   1158 Acetaminophen level(!)  Normal [SS]   1158 Ethanol  Negative [SS]   1158 Comprehensive metabolic panel(!)  Unremarkable [SS]   1158 CBC auto differential(!)  Unremarkable [SS]   1310 Urinalysis Microscopic(!)  UA with mild UTI, will treat with oral antibiotics [SS]   1310 Drug screen panel, emergency  Negative [SS]   1310 POCT urine pregnancy  Negative [SS]      ED Course User Index  [SS] Theodore Barroso MD            Medical Decision Making  After complete evaluation, including thorough history and physical exam, the patient's symptoms are most likely due to acute psychiatric illness. There are no features of history or physical exam indicative of acute medical illness. Vital signs have been stable throughout ED course. The patient has similar history of psychiatric illness, and is currently on  psychiatric medication. The patient currently denies SI, HI but endorses auditory hallucinations.    Due to patient's presentation, history, and current condition, a PEC was completed for the safety of the patient and medical staff during evaluation and management.  One-to-one observation was initiated.     Labs were obtained, unremarkable. UA with possible UTI, will treat with PO ABX.  The patient is currently medically cleared for psychiatric evaluation and transfer to inpatient psychiatric facility as necessary.      Problems Addressed:  Acute cystitis without hematuria: acute illness or injury  Acute psychosis: acute illness or injury  Auditory hallucinations: acute illness or injury  Paranoia: acute illness or injury    Amount and/or Complexity of Data Reviewed  Labs: ordered. Decision-making details documented in ED Course.    Risk  Decision regarding hospitalization.  Diagnosis or treatment significantly limited by social determinants of health.        Launch MDCalc MDM  MDCalc MDM Module  Jul 13 2025 1:49 PM [Theodore Barroso]  Data:  - Test/documents/historian: 3+ tests ordered  3 tests reviewed  3 external notes reviewed  Problems: severe bipolar disoder exacerbation requiring admission (high)  Additional encounter diagnoses: Auditory hallucinations, Paranoia, Acute psychosis, Acute cystitis without hematuria  Risk: admission to Psych facility  (high)      Clinical Impression:       ICD-10-CM ICD-9-CM   1. Acute psychosis  F23 298.9   2. Auditory hallucinations  R44.0 780.1   3. Paranoia  F22 297.1   4. Acute cystitis without hematuria  N30.00 595.0         Follow-up Information    None          ED Disposition Condition    Transfer to Psych Facility Stable              On re-evaluation, the patient's status has remained stable.  At this time, I believe the patient should be transferred to Psych facility for further evaluation and management of acute psychosis, hallucinations, paranoia.         Theodore Barroso  MD KJ  07/13/25 [id number]

## 2025-07-13 NOTE — ED NOTES
Pt belongings:  One shirt  One pair of shorts  One pair for shoes  One pair of socks  One bra    Pt valuables: (#[id number])  One bracelet  One necklace  One rosary  One license

## 2025-07-13 NOTE — ED NOTES
Pt refuses family notification at this time. Pt informed to let primary RN know if that changes prior to transfer.

## 2025-07-14 LAB — HOLD SPECIMEN: NORMAL

## 2025-07-15 ENCOUNTER — TELEPHONE (OUTPATIENT)
Dept: NEUROLOGY | Facility: CLINIC | Age: 35
End: 2025-07-15
Payer: MEDICAID

## 2025-07-15 LAB — BACTERIA UR CULT: ABNORMAL

## 2025-07-15 NOTE — TELEPHONE ENCOUNTER
I attempted to reach patient to reschedule neurology appt due to Dr Solorzano being out on 7/21/25, unable to leave a voicemail.